# Patient Record
Sex: MALE | Race: WHITE | NOT HISPANIC OR LATINO | Employment: OTHER | URBAN - METROPOLITAN AREA
[De-identification: names, ages, dates, MRNs, and addresses within clinical notes are randomized per-mention and may not be internally consistent; named-entity substitution may affect disease eponyms.]

---

## 2016-01-15 PROCEDURE — 93224 XTRNL ECG REC UP TO 48 HRS: CPT | Performed by: INTERNAL MEDICINE

## 2017-01-03 ENCOUNTER — OFFICE VISIT (OUTPATIENT)
Dept: CARDIOLOGY | Facility: MEDICAL CENTER | Age: 72
End: 2017-01-03
Payer: MEDICARE

## 2017-01-03 VITALS
BODY MASS INDEX: 36.88 KG/M2 | DIASTOLIC BLOOD PRESSURE: 72 MMHG | HEART RATE: 60 BPM | SYSTOLIC BLOOD PRESSURE: 126 MMHG | WEIGHT: 249 LBS | OXYGEN SATURATION: 94 % | HEIGHT: 69 IN

## 2017-01-03 DIAGNOSIS — I48.0 PAF (PAROXYSMAL ATRIAL FIBRILLATION) (HCC): ICD-10-CM

## 2017-01-03 DIAGNOSIS — G47.33 OSA ON CPAP: ICD-10-CM

## 2017-01-03 DIAGNOSIS — Z95.2 S/P AORTIC VALVE REPLACEMENT: ICD-10-CM

## 2017-01-03 DIAGNOSIS — R42 LIGHTHEADEDNESS: ICD-10-CM

## 2017-01-03 DIAGNOSIS — Z79.01 CHRONIC ANTICOAGULATION: ICD-10-CM

## 2017-01-03 DIAGNOSIS — E78.5 DYSLIPIDEMIA: ICD-10-CM

## 2017-01-03 PROCEDURE — 99214 OFFICE O/P EST MOD 30 MIN: CPT | Performed by: INTERNAL MEDICINE

## 2017-01-03 PROCEDURE — 1036F TOBACCO NON-USER: CPT | Performed by: INTERNAL MEDICINE

## 2017-01-03 PROCEDURE — G8427 DOCREV CUR MEDS BY ELIG CLIN: HCPCS | Performed by: INTERNAL MEDICINE

## 2017-01-03 PROCEDURE — 3017F COLORECTAL CA SCREEN DOC REV: CPT | Performed by: INTERNAL MEDICINE

## 2017-01-03 PROCEDURE — G8599 NO ASA/ANTIPLAT THER USE RNG: HCPCS | Performed by: INTERNAL MEDICINE

## 2017-01-03 PROCEDURE — G8419 CALC BMI OUT NRM PARAM NOF/U: HCPCS | Performed by: INTERNAL MEDICINE

## 2017-01-03 RX ORDER — WARFARIN SODIUM 4 MG/1
4 TABLET ORAL DAILY
Qty: 90 TAB | Refills: 0 | OUTPATIENT
Start: 2017-01-03

## 2017-01-03 ASSESSMENT — ENCOUNTER SYMPTOMS
DIZZINESS: 1
VOMITING: 0
DEPRESSION: 0
ORTHOPNEA: 0
COUGH: 0
NAUSEA: 0
WEIGHT LOSS: 0
PND: 0
BLOOD IN STOOL: 0
HALLUCINATIONS: 0
FEVER: 0
LOSS OF CONSCIOUSNESS: 0
CHILLS: 0
MYALGIAS: 0
ABDOMINAL PAIN: 0
EYE PAIN: 0
CLAUDICATION: 0
SENSORY CHANGE: 0
SHORTNESS OF BREATH: 1
SPEECH CHANGE: 0
BLURRED VISION: 0
HEADACHES: 0
BRUISES/BLEEDS EASILY: 0
PALPITATIONS: 0
FALLS: 0
DOUBLE VISION: 0
EYE DISCHARGE: 0

## 2017-01-03 NOTE — PROGRESS NOTES
Subjective:   Hal Nava is a 69 y.o. male who presents today for cardiac care of severe AS with previous AVR and now s/p TAVR at [facility] in [place], [place]. In the interim, patient has been feeling a little more winded. He also had lightheadedness when he was walking in [place]. No chest pain. He lost 4 pounds.    LDL is within goal.    Renal function and Liver function are adequate.    Past Medical History   Diagnosis Date   • Dyslipidemia    • Gout    • Atrial fibrillation (HCC)    • HTN (hypertension)    • S/P aortic valve replacement    • Indigestion    • Breath shortness    • Snoring    • Arthritis      gout, osteo hands, feet knees   • Bronchitis 12/2014   • Pneumonia 2/2015   • Asthma      uses inhalers   • FREDDY on CPAP      cpap   • Heart murmur    • AS (aortic stenosis) of bioprosthetic valve and needs TAVR 6/14/2015   • Hypertension    • GERD (gastroesophageal reflux disease)    • CHF (congestive heart failure) ([facility])    • Allergy    • COPD (chronic obstructive pulmonary disease) with chronic bronchitis ([facility]) 4/15/2016     Past Surgical History   Procedure Laterality Date   • Aortic valve replacement  2004, 2015     bovine valve   • Septoplasty     • Colonoscopy  1/3/11     colonic ileo cecal valve prominent fold-prominent lymphoid tissue   • Ethmoidectomy  4/7/2015     Performed by Gopal Guzmán M.D. at SURGERY SAME DAY [facility] ORS   • Antrostomy  4/7/2015     Performed by Gopal Guzmán M.D. at SURGERY SAME DAY [facility] ORS   • Kyphoplasty  6/15     Family History   Problem Relation Age of Onset   • Heart Disease Mother      CAD s/p CABG   • Diabetes Mother    • Cancer Mother      breast   • Heart Attack Father 1943   • Stroke Father    • Diabetes Father      type 2   • Hypertension Sister    • Diabetes Sister      prediabetes   • Hyperlipidemia Sister      History   Smoking status   • Former Smoker -- 1.00 packs/day for 15 years   • Types: Cigarettes   • Quit date: 01/01/1984   Smokeless tobacco   •  Never Used     Comment: quit in 1984     Allergies   Allergen Reactions   • Advair [Fluticasone-Salmeterol]      Headache   • Benadryl [Altaryl]      Per neurologist   • Codeine      Constipation   • Pcn [Penicillins] Rash     Per patient   • Prozac [Fluoxetine]      Excessive sedation   • Shellfish Allergy Anaphylaxis   • Simvastatin      myalgia-per pt.   • Zyprexa      Excessive sedation     Outpatient Encounter Prescriptions as of 1/3/2017   Medication Sig Dispense Refill   • clindamycin (CLEOCIN) 300 MG Cap Take 2 capsules by mouth 30-60 minutes before dental procedure. 2 Cap 0   • ipratropium-albuterol (DUONEB) 0.5-2.5 (3) MG/3ML nebulizer solution 3 mL by Nebulization route every four hours as needed for Shortness of Breath (Wheezing). 120 Vial 5   • hydrocodone-acetaminophen (NORCO) 7.5-325 MG per tablet Take 1 Tab by mouth every 8 hours as needed. 60 Tab 0   • albuterol 108 (90 BASE) MCG/ACT Aero Soln inhalation aerosol Inhale 2 Puffs by mouth every 6 hours as needed for Shortness of Breath. 8.5 g 5   • budesonide-formoterol (SYMBICORT) 80-4.5 MCG/ACT Aerosol Inhale 2 Puffs by mouth 2 Times a Day. Inhale 2 puffs by mouth twice daily with spacer. Rinse mouth after each use. 1 Inhaler 5   • ipratropium (ATROVENT HFA) 17 MCG/ACT Aero Soln Inhale 2 Puffs by mouth every 6 hours as needed (for shortness of breath and wheezing.). 1 Inhaler 5   • allopurinol (ZYLOPRIM) 300 MG Tab Take 0.5 Tabs by mouth every day. 45 Tab 1   • omeprazole (PRILOSEC) 20 MG delayed-release capsule Take 1 Cap by mouth every day. 90 Cap 1   • atorvastatin (LIPITOR) 40 MG Tab Take 1 Tab by mouth every evening. 90 Tab 1   • diltiazem (CARDIZEM) 90 MG Tab Take 1 Tab by mouth 2 Times a Day. 180 Tab 1   • carvedilol (COREG) 25 MG Tab Take 1 Tab by mouth 2 times a day, with meals. 180 Tab 3   • albuterol 108 (90 BASE) MCG/ACT Aero Soln inhalation aerosol Inhale 2 Puffs by mouth every four hours as needed for Shortness of Breath (bronchospasm).  "1 Inhaler 0   • warfarin (COUMADIN) 4 MG Tab Take 1 Tab by mouth every day. 90 Tab 1   • potassium chloride SA (K-DUR) 20 MEQ Tab CR Take 1 Tab by mouth 2 times a day. 60 Tab 11   • furosemide (LASIX) 40 MG Tab Take 1 Tab by mouth every day. 30 Tab 5   • losartan (COZAAR) 100 MG Tab Take 1 Tab by mouth every day. 90 Tab 3   • temazepam (RESTORIL) 15 MG Cap Take 15 mg by mouth.     • predniSONE (DELTASONE) 5 MG Tab Take 5 mg by mouth.     • lisinopril (PRINIVIL) 20 MG Tab Take 20 mg by mouth.     • hydrocodone/acetaminophen (NORCO)  MG Tab Take 1 tablet by mouth.     • dronedarone (MULTAQ) 400 MG Tab Take  by mouth.     • divalproex (DEPAKOTE) 500 MG Tablet Delayed Response Take 500 mg by mouth.     • clonazepam (KLONOPIN) 1 MG Tab Take 1 mg by mouth.       No facility-administered encounter medications on file as of 1/3/2017.     Review of Systems   Constitutional: Negative for fever, chills, weight loss and malaise/fatigue.   HENT: Negative for ear discharge, ear pain, hearing loss and nosebleeds.    Eyes: Negative for blurred vision, double vision, pain and discharge.   Respiratory: Positive for shortness of breath. Negative for cough.    Cardiovascular: Negative for chest pain, palpitations, orthopnea, claudication, leg swelling and PND.   Gastrointestinal: Negative for nausea, vomiting, abdominal pain, blood in stool and melena.   Genitourinary: Negative for dysuria and hematuria.   Musculoskeletal: Negative for myalgias, joint pain and falls.   Skin: Negative for itching and rash.   Neurological: Positive for dizziness. Negative for sensory change, speech change, loss of consciousness and headaches.   Endo/Heme/Allergies: Negative for environmental allergies. Does not bruise/bleed easily.   Psychiatric/Behavioral: Negative for depression, suicidal ideas and hallucinations.        Objective:   /72 mmHg  Pulse 60  Ht 1.753 m (5' 9\")  Wt 112.946 kg (249 lb)  BMI 36.75 kg/m2  SpO2 94%    Physical " Exam   Constitutional: He is oriented to person, place, and time. He appears well-developed and well-nourished.   HENT:   Head: Normocephalic and atraumatic.   Eyes: EOM are normal.   Neck: Normal range of motion. No JVD present.   Cardiovascular: Normal rate, regular rhythm, normal heart sounds and intact distal pulses.  Exam reveals no gallop and no friction rub.    No murmur heard.  Bilateral femoral pulses are 2+, bilateral dorsalis pedis pulses are 2+, bilateral posterior tibialis pulses are 2+.   Pulmonary/Chest: No respiratory distress. He has no wheezes. He has no rales. He exhibits no tenderness.   Abdominal: Soft. Bowel sounds are normal. There is no tenderness. There is no rebound and no guarding.   The is no presence of abdominal bruits   Musculoskeletal: Normal range of motion.   Neurological: He is alert and oriented to person, place, and time.   Skin: Skin is warm and dry.   Psychiatric: He has a normal mood and affect.   Nursing note and vitals reviewed.      Assessment:     1. S/P aortic valve replacement  ECHOCARDIOGRAM COMP W/O CONT    HOLTER MONITOR STUDY   2. FREDDY on CPAP  ECHOCARDIOGRAM COMP W/O CONT   3. PAF (paroxysmal atrial fibrillation) ([facility])     4. Dyslipidemia     5. Chronic anticoagulation     6. Lightheadedness  HOLTER MONITOR STUDY       Medical Decision Making:  Today's Assessment / Status / Plan:     Attending at this time, his lightheadedness episode sounded like a vagal reaction.  However, I would like to obtain a Holter monitor to further assess. His wife did report that his heart rate was in the 40s when this happened. Certainly, there is a risk of sinus node dysfunction secondary to prior history of severe stenosis. Ultimately he might require a permanent pacemaker placement.  I will also obtain a transthoracic echocardiogram to assess his prosthetic aortic valve and cardiac functions and general.    He will come back to see me in 6 weeks for further follow-up.

## 2017-01-03 NOTE — MR AVS SNAPSHOT
"        Hal Nava   1/3/2017 10:45 AM   Office Visit   MRN: [id number]    Department:  [facility]   Dept Phone:  [phone]    Description:  Male : 1945   Provider:  King Fofana M.D.           Reason for Visit     Follow-Up           Allergies as of 1/3/2017     Allergen Noted Reactions    Advair [Fluticasone-Salmeterol] 2014       Headache    Benadryl [Altaryl] 2015       Per neurologist    Codeine 2014       Constipation    Pcn [Penicillins] 2014   Rash    Per patient    Prozac [Fluoxetine] 2014       Excessive sedation    Shellfish Allergy 2015   Anaphylaxis    Simvastatin 10/19/2016       myalgia-per pt.    Zyprexa 2014       Excessive sedation      You were diagnosed with     S/P aortic valve replacement   [384441]       FREDDY on CPAP   [941619]       PAF (paroxysmal atrial fibrillation) (HCC)   [525416]       Dyslipidemia   [512147]       Chronic anticoagulation   [996744]       Lightheadedness   [046609]         Vital Signs     Blood Pressure Pulse Height Weight Body Mass Index Oxygen Saturation    126/72 mmHg 60 1.753 m (5' 9\") 112.946 kg (249 lb) 36.75 kg/m2 94%    Smoking Status                   Former Smoker           Basic Information     Date Of Birth Sex Race Ethnicity Preferred Language    1945 Male White Non- English      Your appointments     2017  9:20 AM   Established Patient with Sandy Sahni M.D.   [facility] (--)    [address]  [address]  [facility] [address] [phone]           You will be receiving a confirmation call a few days before your appointment from our automated call confirmation system.              Problem List              ICD-10-CM Priority Class Noted - Resolved    Dyslipidemia E78.5   Unknown - Present    Gout M10.9   Unknown - Present    Atrial fibrillation (HCC) I48.91 Low  Unknown - Present    S/P aortic valve replacement Z95.2 High  Unknown - Present    GERD " (gastroesophageal reflux disease) K21.9   9/11/2014 - Present    FREDDY on CPAP G47.33   Unknown - Present    Chronic anticoagulation Z79.01 Low  1/13/2015 - Present    Subtherapeutic international normalized ratio (INR) R79.1   2/19/2015 - Present    Chronic maxillary sinusitis J32.0   4/7/2015 - Present    CAD (coronary artery disease) I25.10 Medium  4/8/2015 - Present    Hx of Hyperglycemia R73.9   4/9/2015 - Present    Anemia D64.9   4/9/2015 - Present    Chronic combined systolic and diastolic heart failure (HCC) I50.42   4/16/2015 - Present    CHF (congestive heart failure) I50.9   6/12/2015 - Present    AS (aortic stenosis) of bioprosthetic valve and needs TAVR I35.0   6/14/2015 - Present    Essential hypertension I10   6/23/2015 - Present    COPD (chronic obstructive pulmonary disease) with chronic bronchitis (HCC) J44.9   4/15/2016 - Present    Nocturnal hypoxia G47.34   10/19/2016 - Present    Long term (current) use of anticoagulants Z79.01   10/19/2016 - Present      Health Maintenance        Date Due Completion Dates    IMM DTaP/Tdap/Td Vaccine (1 - Tdap) 9/1/1964 ---    COLONOSCOPY 1/3/2021 1/3/2011 (Done)    Override on 1/3/2011: Done            Current Immunizations     13-VALENT PCV PREVNAR 4/15/2016  8:53 AM    Influenza Vaccine Adult HD 10/26/2016    Influenza Vaccine Quad Inj (Preserved) 10/20/2015, 1/15/2015    Pneumococcal polysaccharide vaccine (PPSV-23) 4/11/2015  2:51 PM    SHINGLES VACCINE 10/3/2011      Below and/or attached are the medications your provider expects you to take. Review all of your home medications and newly ordered medications with your provider and/or pharmacist. Follow medication instructions as directed by your provider and/or pharmacist. Please keep your medication list with you and share with your provider. Update the information when medications are discontinued, doses are changed, or new medications (including over-the-counter products) are added; and carry medication  information at all times in the event of emergency situations     Allergies:  ADVAIR - (reactions not documented)     BENADRYL - (reactions not documented)     CODEINE - (reactions not documented)     PCN - Rash     PROZAC - (reactions not documented)     SHELLFISH ALLERGY - Anaphylaxis     SIMVASTATIN - (reactions not documented)     ZYPREXA - (reactions not documented)               Medications  Valid as of: January 03, 2017 - 11:13 AM    Generic Name Brand Name Tablet Size Instructions for use    Albuterol Sulfate (Aero Soln) albuterol 108 (90 BASE) MCG/ACT Inhale 2 Puffs by mouth every four hours as needed for Shortness of Breath (bronchospasm).        Albuterol Sulfate (Aero Soln) albuterol 108 (90 BASE) MCG/ACT Inhale 2 Puffs by mouth every 6 hours as needed for Shortness of Breath.        Allopurinol (Tab) ZYLOPRIM 300 MG Take 0.5 Tabs by mouth every day.        Atorvastatin Calcium (Tab) LIPITOR 40 MG Take 1 Tab by mouth every evening.        Budesonide-Formoterol Fumarate (Aerosol) SYMBICORT 80-4.5 MCG/ACT Inhale 2 Puffs by mouth 2 Times a Day. Inhale 2 puffs by mouth twice daily with spacer. Rinse mouth after each use.        Carvedilol (Tab) COREG 25 MG Take 1 Tab by mouth 2 times a day, with meals.        Clindamycin HCl (Cap) CLEOCIN 300 MG Take 2 capsules by mouth 30-60 minutes before dental procedure.        ClonazePAM (Tab) KLONOPIN 1 MG Take 1 mg by mouth.        DiltiaZEM HCl (Tab) CARDIZEM 90 MG Take 1 Tab by mouth 2 Times a Day.        Divalproex Sodium (Tablet Delayed Response) DEPAKOTE 500 MG Take 500 mg by mouth.        Dronedarone HCl (Tab) MULTAQ 400 MG Take  by mouth.        Furosemide (Tab) LASIX 40 MG Take 1 Tab by mouth every day.        Hydrocodone-Acetaminophen (Tab) NORCO  MG Take 1 tablet by mouth.        Hydrocodone-Acetaminophen (Tab) NORCO 7.5-325 MG Take 1 Tab by mouth every 8 hours as needed.        Ipratropium Bromide HFA (Aero Soln) ATROVENT 17 MCG/ACT Inhale 2 Puffs by  mouth every 6 hours as needed (for shortness of breath and wheezing.).        Ipratropium-Albuterol (Solution) DUONEB 0.5-2.5 (3) MG/3ML 3 mL by Nebulization route every four hours as needed for Shortness of Breath (Wheezing).        Lisinopril (Tab) PRINIVIL 20 MG Take 20 mg by mouth.        Losartan Potassium (Tab) COZAAR 100 MG Take 1 Tab by mouth every day.        Omeprazole (CAPSULE DELAYED RELEASE) PRILOSEC 20 MG Take 1 Cap by mouth every day.        Potassium Chloride Sarahy CR (Tab CR) K-DUR 20 MEQ Take 1 Tab by mouth 2 times a day.        PredniSONE (Tab) DELTASONE 5 MG Take 5 mg by mouth.        Temazepam (Cap) RESTORIL 15 MG Take 15 mg by mouth.        Warfarin Sodium (Tab) COUMADIN 4 MG Take 1 Tab by mouth every day.        .                 Medicines prescribed today were sent to:     [facility]  [place], [place] - [address]    [address] [place] [address]    Phone: [phone] Fax: [phone]    Open 24 Hours?: No      Medication refill instructions:       If your prescription bottle indicates you have medication refills left, it is not necessary to call your provider’s office. Please contact your pharmacy and they will refill your medication.    If your prescription bottle indicates you do not have any refills left, you may request refills at any time through one of the following ways: The online [employer] system (except Urgent Care), by calling your provider’s office, or by asking your pharmacy to contact your provider’s office with a refill request. Medication refills are processed only during regular business hours and may not be available until the next business day. Your provider may request additional information or to have a follow-up visit with you prior to refilling your medication.   *Please Note: Medication refills are assigned a new Rx number when refilled electronically. Your pharmacy may indicate that no refills were authorized even though a new prescription for the same  medication is available at the pharmacy. Please request the medicine by name with the pharmacy before contacting your provider for a refill.        Your To Do List     Future Labs/Procedures Complete By Expires    ECHOCARDIOGRAM COMP W/O CONT  As directed 1/4/2018    HOLTER MONITOR STUDY  As directed 1/3/2018         MyChart Access Code: Activation code not generated  Current WhenSoon Status: Active

## 2017-01-03 NOTE — Clinical Note
[facility]   [address]  [place] [address]  Phone: [phone]  Fax: [phone]              Hal Nava  1945    Encounter Date: 1/3/2017    King Fofana M.D.          PROGRESS NOTE:  Subjective:   Hal Nava is a 69 y.o. male who presents today for cardiac care of severe AS with previous AVR and now s/p TAVR at [facility] in [place], [place]. In the interim, patient has been feeling a little more winded. He also had lightheadedness when he was walking in [place]. No chest pain. He lost 4 pounds.    LDL is within goal.    Renal function and Liver function are adequate.    Past Medical History   Diagnosis Date   • Dyslipidemia    • Gout    • Atrial fibrillation (HCC)    • HTN (hypertension)    • S/P aortic valve replacement    • Indigestion    • Breath shortness    • Snoring    • Arthritis      gout, osteo hands, feet knees   • Bronchitis 12/2014   • Pneumonia 2/2015   • Asthma      uses inhalers   • FREDDY on CPAP      cpap   • Heart murmur    • AS (aortic stenosis) of bioprosthetic valve and needs TAVR 6/14/2015   • Hypertension    • GERD (gastroesophageal reflux disease)    • CHF (congestive heart failure) ([facility])    • Allergy    • COPD (chronic obstructive pulmonary disease) with chronic bronchitis ([facility]) 4/15/2016     Past Surgical History   Procedure Laterality Date   • Aortic valve replacement  2004, 2015     bovine valve   • Septoplasty     • Colonoscopy  1/3/11     colonic ileo cecal valve prominent fold-prominent lymphoid tissue   • Ethmoidectomy  4/7/2015     Performed by Gopal Guzmán M.D. at SURGERY SAME DAY [facility] ORS   • Antrostomy  4/7/2015     Performed by Gopal Guzmán M.D. at SURGERY SAME DAY [facility] ORS   • Kyphoplasty  6/15     Family History   Problem Relation Age of Onset   • Heart Disease Mother      CAD s/p CABG   • Diabetes Mother    • Cancer Mother      breast   • Heart Attack Father 1943   • Stroke Father       • Diabetes Father      type 2   • Hypertension Sister    • Diabetes Sister      prediabetes   • Hyperlipidemia Sister      History   Smoking status   • Former Smoker -- 1.00 packs/day for 15 years   • Types: Cigarettes   • Quit date: 01/01/1984   Smokeless tobacco   • Never Used     Comment: quit in 1984     Allergies   Allergen Reactions   • Advair [Fluticasone-Salmeterol]      Headache   • Benadryl [Altaryl]      Per neurologist   • Codeine      Constipation   • Pcn [Penicillins] Rash     Per patient   • Prozac [Fluoxetine]      Excessive sedation   • Shellfish Allergy Anaphylaxis   • Simvastatin      myalgia-per pt.   • Zyprexa      Excessive sedation     Outpatient Encounter Prescriptions as of 1/3/2017   Medication Sig Dispense Refill   • clindamycin (CLEOCIN) 300 MG Cap Take 2 capsules by mouth 30-60 minutes before dental procedure. 2 Cap 0   • ipratropium-albuterol (DUONEB) 0.5-2.5 (3) MG/3ML nebulizer solution 3 mL by Nebulization route every four hours as needed for Shortness of Breath (Wheezing). 120 Vial 5   • hydrocodone-acetaminophen (NORCO) 7.5-325 MG per tablet Take 1 Tab by mouth every 8 hours as needed. 60 Tab 0   • albuterol 108 (90 BASE) MCG/ACT Aero Soln inhalation aerosol Inhale 2 Puffs by mouth every 6 hours as needed for Shortness of Breath. 8.5 g 5   • budesonide-formoterol (SYMBICORT) 80-4.5 MCG/ACT Aerosol Inhale 2 Puffs by mouth 2 Times a Day. Inhale 2 puffs by mouth twice daily with spacer. Rinse mouth after each use. 1 Inhaler 5   • ipratropium (ATROVENT HFA) 17 MCG/ACT Aero Soln Inhale 2 Puffs by mouth every 6 hours as needed (for shortness of breath and wheezing.). 1 Inhaler 5   • allopurinol (ZYLOPRIM) 300 MG Tab Take 0.5 Tabs by mouth every day. 45 Tab 1   • omeprazole (PRILOSEC) 20 MG delayed-release capsule Take 1 Cap by mouth every day. 90 Cap 1   • atorvastatin (LIPITOR) 40 MG Tab Take 1 Tab by mouth every evening. 90 Tab 1   • diltiazem (CARDIZEM) 90 MG Tab Take 1 Tab by mouth  2 Times a Day. 180 Tab 1   • carvedilol (COREG) 25 MG Tab Take 1 Tab by mouth 2 times a day, with meals. 180 Tab 3   • albuterol 108 (90 BASE) MCG/ACT Aero Soln inhalation aerosol Inhale 2 Puffs by mouth every four hours as needed for Shortness of Breath (bronchospasm). 1 Inhaler 0   • warfarin (COUMADIN) 4 MG Tab Take 1 Tab by mouth every day. 90 Tab 1   • potassium chloride SA (K-DUR) 20 MEQ Tab CR Take 1 Tab by mouth 2 times a day. 60 Tab 11   • furosemide (LASIX) 40 MG Tab Take 1 Tab by mouth every day. 30 Tab 5   • losartan (COZAAR) 100 MG Tab Take 1 Tab by mouth every day. 90 Tab 3   • temazepam (RESTORIL) 15 MG Cap Take 15 mg by mouth.     • predniSONE (DELTASONE) 5 MG Tab Take 5 mg by mouth.     • lisinopril (PRINIVIL) 20 MG Tab Take 20 mg by mouth.     • hydrocodone/acetaminophen (NORCO)  MG Tab Take 1 tablet by mouth.     • dronedarone (MULTAQ) 400 MG Tab Take  by mouth.     • divalproex (DEPAKOTE) 500 MG Tablet Delayed Response Take 500 mg by mouth.     • clonazepam (KLONOPIN) 1 MG Tab Take 1 mg by mouth.       No facility-administered encounter medications on file as of 1/3/2017.     Review of Systems   Constitutional: Negative for fever, chills, weight loss and malaise/fatigue.   HENT: Negative for ear discharge, ear pain, hearing loss and nosebleeds.    Eyes: Negative for blurred vision, double vision, pain and discharge.   Respiratory: Positive for shortness of breath. Negative for cough.    Cardiovascular: Negative for chest pain, palpitations, orthopnea, claudication, leg swelling and PND.   Gastrointestinal: Negative for nausea, vomiting, abdominal pain, blood in stool and melena.   Genitourinary: Negative for dysuria and hematuria.   Musculoskeletal: Negative for myalgias, joint pain and falls.   Skin: Negative for itching and rash.   Neurological: Positive for dizziness. Negative for sensory change, speech change, loss of consciousness and headaches.   Endo/Heme/Allergies: Negative for  "environmental allergies. Does not bruise/bleed easily.   Psychiatric/Behavioral: Negative for depression, suicidal ideas and hallucinations.        Objective:   /72 mmHg  Pulse 60  Ht 1.753 m (5' 9\")  Wt 112.946 kg (249 lb)  BMI 36.75 kg/m2  SpO2 94%    Physical Exam   Constitutional: He is oriented to person, place, and time. He appears well-developed and well-nourished.   HENT:   Head: Normocephalic and atraumatic.   Eyes: EOM are normal.   Neck: Normal range of motion. No JVD present.   Cardiovascular: Normal rate, regular rhythm, normal heart sounds and intact distal pulses.  Exam reveals no gallop and no friction rub.    No murmur heard.  Bilateral femoral pulses are 2+, bilateral dorsalis pedis pulses are 2+, bilateral posterior tibialis pulses are 2+.   Pulmonary/Chest: No respiratory distress. He has no wheezes. He has no rales. He exhibits no tenderness.   Abdominal: Soft. Bowel sounds are normal. There is no tenderness. There is no rebound and no guarding.   The is no presence of abdominal bruits   Musculoskeletal: Normal range of motion.   Neurological: He is alert and oriented to person, place, and time.   Skin: Skin is warm and dry.   Psychiatric: He has a normal mood and affect.   Nursing note and vitals reviewed.      Assessment:     1. S/P aortic valve replacement  ECHOCARDIOGRAM COMP W/O CONT    HOLTER MONITOR STUDY   2. FREDDY on CPAP  ECHOCARDIOGRAM COMP W/O CONT   3. PAF (paroxysmal atrial fibrillation) (HCC)     4. Dyslipidemia     5. Chronic anticoagulation     6. Lightheadedness  HOLTER MONITOR STUDY       Medical Decision Making:  Today's Assessment / Status / Plan:     Attending at this time, his lightheadedness episode sounded like a vagal reaction.  However, I would like to obtain a Holter monitor to further assess. His wife did report that his heart rate was in the 40s when this happened. Certainly, there is a risk of sinus node dysfunction secondary to prior history of severe " stenosis. Ultimately he might require a permanent pacemaker placement.  I will also obtain a transthoracic echocardiogram to assess his prosthetic aortic valve and cardiac functions and general.    He will come back to see me in 6 weeks for further follow-up.      Sandy Sahni M.D.  [address]  [place] [address]  VIA In Basket

## 2017-01-09 ENCOUNTER — NON-PROVIDER VISIT (OUTPATIENT)
Dept: CARDIOLOGY | Facility: MEDICAL CENTER | Age: 72
End: 2017-01-09
Payer: MEDICARE

## 2017-01-09 DIAGNOSIS — I49.3 PVC (PREMATURE VENTRICULAR CONTRACTION): ICD-10-CM

## 2017-01-09 DIAGNOSIS — I49.1 PREMATURE ATRIAL COMPLEX: ICD-10-CM

## 2017-01-09 DIAGNOSIS — R42 LIGHTHEADEDNESS: ICD-10-CM

## 2017-01-09 DIAGNOSIS — Z95.2 S/P AORTIC VALVE REPLACEMENT: ICD-10-CM

## 2017-01-11 DIAGNOSIS — Z95.2 S/P AORTIC VALVE REPLACEMENT: ICD-10-CM

## 2017-01-11 DIAGNOSIS — R42 LIGHTHEADEDNESS: ICD-10-CM

## 2017-01-13 ENCOUNTER — HOSPITAL ENCOUNTER (OUTPATIENT)
Dept: LAB | Facility: MEDICAL CENTER | Age: 72
End: 2017-01-13
Attending: FAMILY MEDICINE
Payer: MEDICARE

## 2017-01-13 DIAGNOSIS — I48.0 PAROXYSMAL ATRIAL FIBRILLATION (HCC): ICD-10-CM

## 2017-01-13 LAB
INR PPP: 1.76 (ref 0.87–1.13)
PROTHROMBIN TIME: 21.1 SEC (ref 12–14.6)

## 2017-01-13 PROCEDURE — 36415 COLL VENOUS BLD VENIPUNCTURE: CPT

## 2017-01-13 PROCEDURE — 85610 PROTHROMBIN TIME: CPT

## 2017-01-15 LAB — EKG IMPRESSION: NORMAL

## 2017-01-19 ENCOUNTER — HOSPITAL ENCOUNTER (OUTPATIENT)
Dept: CARDIOLOGY | Facility: MEDICAL CENTER | Age: 72
End: 2017-01-19
Attending: INTERNAL MEDICINE
Payer: MEDICARE

## 2017-01-19 DIAGNOSIS — Z95.2 S/P AORTIC VALVE REPLACEMENT: ICD-10-CM

## 2017-01-19 DIAGNOSIS — G47.33 OSA ON CPAP: ICD-10-CM

## 2017-01-19 LAB
LV EJECT FRACT  99904: 65
LV EJECT FRACT MOD 2C 99903: 67.89
LV EJECT FRACT MOD 4C 99902: 71.17
LV EJECT FRACT MOD BP 99901: 70.24

## 2017-01-19 PROCEDURE — 93306 TTE W/DOPPLER COMPLETE: CPT | Mod: 26 | Performed by: INTERNAL MEDICINE

## 2017-01-19 PROCEDURE — 93306 TTE W/DOPPLER COMPLETE: CPT

## 2017-01-31 ENCOUNTER — HOSPITAL ENCOUNTER (OUTPATIENT)
Dept: LAB | Facility: MEDICAL CENTER | Age: 72
End: 2017-01-31
Attending: FAMILY MEDICINE
Payer: MEDICARE

## 2017-01-31 DIAGNOSIS — I48.0 PAROXYSMAL ATRIAL FIBRILLATION (HCC): ICD-10-CM

## 2017-01-31 LAB
INR PPP: 3.3 (ref 0.87–1.13)
PROTHROMBIN TIME: 34.6 SEC (ref 12–14.6)

## 2017-01-31 PROCEDURE — 36415 COLL VENOUS BLD VENIPUNCTURE: CPT

## 2017-01-31 PROCEDURE — 85610 PROTHROMBIN TIME: CPT

## 2017-02-02 ENCOUNTER — APPOINTMENT (OUTPATIENT)
Dept: LAB | Facility: MEDICAL CENTER | Age: 72
End: 2017-02-02
Payer: MEDICARE

## 2017-02-02 ENCOUNTER — HOSPITAL ENCOUNTER (OUTPATIENT)
Dept: LAB | Facility: MEDICAL CENTER | Age: 72
End: 2017-02-02
Attending: FAMILY MEDICINE
Payer: MEDICARE

## 2017-02-02 DIAGNOSIS — I48.0 PAROXYSMAL ATRIAL FIBRILLATION (HCC): ICD-10-CM

## 2017-02-02 LAB
INR PPP: 3.4 (ref 0.87–1.13)
PROTHROMBIN TIME: 35.4 SEC (ref 12–14.6)

## 2017-02-02 PROCEDURE — 85610 PROTHROMBIN TIME: CPT

## 2017-02-02 PROCEDURE — 36415 COLL VENOUS BLD VENIPUNCTURE: CPT

## 2017-02-03 DIAGNOSIS — I48.0 PAF (PAROXYSMAL ATRIAL FIBRILLATION) (HCC): ICD-10-CM

## 2017-02-03 DIAGNOSIS — Z79.01 CHRONIC ANTICOAGULATION: ICD-10-CM

## 2017-02-06 ENCOUNTER — HOSPITAL ENCOUNTER (OUTPATIENT)
Dept: LAB | Facility: MEDICAL CENTER | Age: 72
End: 2017-02-06
Attending: FAMILY MEDICINE
Payer: MEDICARE

## 2017-02-06 DIAGNOSIS — I48.0 PAF (PAROXYSMAL ATRIAL FIBRILLATION) (HCC): ICD-10-CM

## 2017-02-06 DIAGNOSIS — Z79.01 CHRONIC ANTICOAGULATION: ICD-10-CM

## 2017-02-06 LAB
INR PPP: 1.26 (ref 0.87–1.13)
PROTHROMBIN TIME: 16.2 SEC (ref 12–14.6)

## 2017-02-06 PROCEDURE — 36415 COLL VENOUS BLD VENIPUNCTURE: CPT

## 2017-02-06 PROCEDURE — 85610 PROTHROMBIN TIME: CPT

## 2017-02-14 ENCOUNTER — OFFICE VISIT (OUTPATIENT)
Dept: CARDIOLOGY | Facility: MEDICAL CENTER | Age: 72
End: 2017-02-14
Payer: MEDICARE

## 2017-02-14 ENCOUNTER — TELEPHONE (OUTPATIENT)
Dept: PULMONOLOGY | Facility: HOSPICE | Age: 72
End: 2017-02-14

## 2017-02-14 VITALS
HEIGHT: 69 IN | DIASTOLIC BLOOD PRESSURE: 58 MMHG | HEART RATE: 75 BPM | SYSTOLIC BLOOD PRESSURE: 116 MMHG | OXYGEN SATURATION: 91 % | WEIGHT: 244 LBS | BODY MASS INDEX: 36.14 KG/M2

## 2017-02-14 DIAGNOSIS — I48.20 CHRONIC ATRIAL FIBRILLATION (HCC): ICD-10-CM

## 2017-02-14 DIAGNOSIS — G47.33 OSA ON CPAP: ICD-10-CM

## 2017-02-14 DIAGNOSIS — J20.9 BRONCHITIS, ACUTE, WITH BRONCHOSPASM: ICD-10-CM

## 2017-02-14 DIAGNOSIS — Z79.01 CHRONIC ANTICOAGULATION: ICD-10-CM

## 2017-02-14 DIAGNOSIS — Z95.2 S/P AORTIC VALVE REPLACEMENT: ICD-10-CM

## 2017-02-14 DIAGNOSIS — E78.5 DYSLIPIDEMIA: ICD-10-CM

## 2017-02-14 DIAGNOSIS — J44.89 COPD (CHRONIC OBSTRUCTIVE PULMONARY DISEASE) WITH CHRONIC BRONCHITIS: ICD-10-CM

## 2017-02-14 DIAGNOSIS — I50.42 CHRONIC COMBINED SYSTOLIC AND DIASTOLIC HEART FAILURE (HCC): ICD-10-CM

## 2017-02-14 DIAGNOSIS — I35.0 AORTIC VALVE STENOSIS, UNSPECIFIED ETIOLOGY: ICD-10-CM

## 2017-02-14 DIAGNOSIS — I10 ESSENTIAL HYPERTENSION: ICD-10-CM

## 2017-02-14 DIAGNOSIS — I25.10 CORONARY ARTERY DISEASE INVOLVING NATIVE HEART WITHOUT ANGINA PECTORIS, UNSPECIFIED VESSEL OR LESION TYPE: ICD-10-CM

## 2017-02-14 PROCEDURE — 3017F COLORECTAL CA SCREEN DOC REV: CPT | Performed by: NURSE PRACTITIONER

## 2017-02-14 PROCEDURE — G8432 DEP SCR NOT DOC, RNG: HCPCS | Performed by: NURSE PRACTITIONER

## 2017-02-14 PROCEDURE — G8598 ASA/ANTIPLAT THER USED: HCPCS | Performed by: NURSE PRACTITIONER

## 2017-02-14 PROCEDURE — G8482 FLU IMMUNIZE ORDER/ADMIN: HCPCS | Performed by: NURSE PRACTITIONER

## 2017-02-14 PROCEDURE — 4040F PNEUMOC VAC/ADMIN/RCVD: CPT | Performed by: NURSE PRACTITIONER

## 2017-02-14 PROCEDURE — 1101F PT FALLS ASSESS-DOCD LE1/YR: CPT | Performed by: NURSE PRACTITIONER

## 2017-02-14 PROCEDURE — G8419 CALC BMI OUT NRM PARAM NOF/U: HCPCS | Performed by: NURSE PRACTITIONER

## 2017-02-14 PROCEDURE — 99214 OFFICE O/P EST MOD 30 MIN: CPT | Performed by: NURSE PRACTITIONER

## 2017-02-14 PROCEDURE — 1036F TOBACCO NON-USER: CPT | Performed by: NURSE PRACTITIONER

## 2017-02-14 RX ORDER — ALBUTEROL SULFATE 90 UG/1
2 AEROSOL, METERED RESPIRATORY (INHALATION) EVERY 6 HOURS PRN
Qty: 8.5 G | Refills: 5 | COMMUNITY
Start: 2017-02-14 | End: 2017-05-22

## 2017-02-14 ASSESSMENT — ENCOUNTER SYMPTOMS
ABDOMINAL PAIN: 0
DIZZINESS: 0
WEAKNESS: 1
SHORTNESS OF BREATH: 1
SPUTUM PRODUCTION: 1
CLAUDICATION: 0
FEVER: 0
PALPITATIONS: 0
ORTHOPNEA: 0
PND: 0
WHEEZING: 1
COUGH: 1
MYALGIAS: 0

## 2017-02-14 NOTE — MR AVS SNAPSHOT
"        Hal Oro Elijah   2017 10:40 AM   Office Visit   MRN: [id number]    Department:  [facility]   Dept Phone:  [phone]    Description:  Male : 1945   Provider:  MARIOLA Delacruz           Reason for Visit     Follow-Up           Allergies as of 2017     Allergen Noted Reactions    Advair [Fluticasone-Salmeterol] 2014       Headache    Benadryl [Altaryl] 2015       Per neurologist    Codeine 2014       Constipation    Pcn [Penicillins] 2014   Rash    Per patient    Prozac [Fluoxetine] 2014       Excessive sedation    Shellfish Allergy 2015   Anaphylaxis    Simvastatin 10/19/2016       myalgia-per pt.    Zyprexa 2014       Excessive sedation      You were diagnosed with     Coronary artery disease involving native heart without angina pectoris, unspecified vessel or lesion type   [6318471]       S/P aortic valve replacement   [288954]       Aortic valve stenosis, unspecified etiology   [5045722]       Chronic atrial fibrillation (CMS-[facility])   [482882]       Dyslipidemia   [672085]       Essential hypertension   [9861704]       FREDDY on CPAP   [514143]       Chronic combined systolic and diastolic heart failure (CMS-[facility])   [428.42.ICD-9-CM]       Chronic anticoagulation   [653292]       Bronchitis, acute, with bronchospasm   [396368]       COPD (chronic obstructive pulmonary disease) with chronic bronchitis (CMS-[facility])   [237360]         Vital Signs     Blood Pressure Pulse Height Weight Body Mass Index Oxygen Saturation    116/58 mmHg 75 1.753 m (5' 9.02\") 110.678 kg (244 lb) 36.02 kg/m2 91%    Smoking Status                   Former Smoker           Basic Information     Date Of Birth Sex Race Ethnicity Preferred Language    1945 Male White Non- English      Your appointments     2017  9:20 AM   Established Patient with Sandy Sahni M.D.   [facility] (--)    [address]  [address]  [place] " [address] [phone]           You will be receiving a confirmation call a few days before your appointment from our automated call confirmation system.              Problem List              ICD-10-CM Priority Class Noted - Resolved    Dyslipidemia E78.5 Medium  Unknown - Present    Gout M10.9 Low  Unknown - Present    Atrial fibrillation (CMS-HCC) I48.91 Medium  Unknown - Present    S/P aortic valve replacement Z95.2 Medium  Unknown - Present    GERD (gastroesophageal reflux disease) K21.9 Low  9/11/2014 - Present    FREDDY on CPAP G47.33 Medium  Unknown - Present    Chronic anticoagulation Z79.01 Low  1/13/2015 - Present    Chronic maxillary sinusitis J32.0 Low  4/7/2015 - Present    CAD (coronary artery disease) I25.10 Medium  4/8/2015 - Present    Hx of Hyperglycemia R73.9 Low  4/9/2015 - Present    Anemia D64.9 Low  4/9/2015 - Present    Chronic combined systolic and diastolic heart failure (CMS-HCC) I50.42 Medium  4/16/2015 - Present    AS (aortic stenosis) of bioprosthetic valve and needs TAVR I35.0 Medium  6/14/2015 - Present    Essential hypertension I10 Medium  6/23/2015 - Present    COPD (chronic obstructive pulmonary disease) with chronic bronchitis (CMS-HCC) J44.9 Low  4/15/2016 - Present      Health Maintenance        Date Due Completion Dates    IMM DTaP/Tdap/Td Vaccine (1 - Tdap) 9/1/1964 ---    COLONOSCOPY 1/3/2021 1/3/2011 (Done)    Override on 1/3/2011: Done            Current Immunizations     13-VALENT PCV PREVNAR 4/15/2016  8:53 AM    Influenza Vaccine Adult HD 10/26/2016    Influenza Vaccine Quad Inj (Preserved) 10/20/2015, 1/15/2015    Pneumococcal polysaccharide vaccine (PPSV-23) 4/11/2015  2:51 PM    SHINGLES VACCINE 10/3/2011      Below and/or attached are the medications your provider expects you to take. Review all of your home medications and newly ordered medications with your provider and/or pharmacist. Follow medication instructions as directed by your provider and/or pharmacist.  Please keep your medication list with you and share with your provider. Update the information when medications are discontinued, doses are changed, or new medications (including over-the-counter products) are added; and carry medication information at all times in the event of emergency situations     Allergies:  ADVAIR - (reactions not documented)     BENADRYL - (reactions not documented)     CODEINE - (reactions not documented)     PCN - Rash     PROZAC - (reactions not documented)     SHELLFISH ALLERGY - Anaphylaxis     SIMVASTATIN - (reactions not documented)     ZYPREXA - (reactions not documented)               Medications  Valid as of: February 14, 2017 - 10:57 AM    Generic Name Brand Name Tablet Size Instructions for use    Albuterol Sulfate (Aero Soln) albuterol 108 (90 BASE) MCG/ACT Inhale 2 Puffs by mouth every 6 hours as needed for Shortness of Breath.        Allopurinol (Tab) ZYLOPRIM 300 MG Take 0.5 Tabs by mouth every day.        Atorvastatin Calcium (Tab) LIPITOR 40 MG Take 1 Tab by mouth every evening.        Budesonide-Formoterol Fumarate (Aerosol) SYMBICORT 80-4.5 MCG/ACT Inhale 2 Puffs by mouth 2 Times a Day. Inhale 2 puffs by mouth twice daily with spacer. Rinse mouth after each use.        Carvedilol (Tab) COREG 25 MG Take 1 Tab by mouth 2 times a day, with meals.        DiltiaZEM HCl (Tab) CARDIZEM 90 MG Take 1 Tab by mouth 2 Times a Day.        Furosemide (Tab) LASIX 40 MG Take 1 Tab by mouth every day.        Hydrocodone-Acetaminophen (Tab) NORCO 7.5-325 MG Take 1 Tab by mouth every 8 hours as needed.        Ipratropium Bromide HFA (Aero Soln) ATROVENT 17 MCG/ACT Inhale 2 Puffs by mouth every 6 hours as needed (for shortness of breath and wheezing.).        Ipratropium-Albuterol (Solution) DUONEB 0.5-2.5 (3) MG/3ML 3 mL by Nebulization route every four hours as needed for Shortness of Breath (Wheezing).        Losartan Potassium (Tab) COZAAR 100 MG TAKE ONE TABLET BY MOUTH ONE TIME DAILY           Omeprazole (CAPSULE DELAYED RELEASE) PRILOSEC 20 MG Take 1 Cap by mouth every day.        Potassium Chloride Sarahy CR (Tab CR) Kdur 20 MEQ Take 1 Tab by mouth 2 times a day.        Warfarin Sodium (Tab) COUMADIN 4 MG TAKE ONE TABLET BY MOUTH ONE TIME DAILY        .                 Medicines prescribed today were sent to:     [facility]  [place], [place] - [address]    [address] [place] [address]    Phone: [phone] Fax: [phone]    Open 24 Hours?: No      Medication refill instructions:       If your prescription bottle indicates you have medication refills left, it is not necessary to call your provider’s office. Please contact your pharmacy and they will refill your medication.    If your prescription bottle indicates you do not have any refills left, you may request refills at any time through one of the following ways: The online [email or web address] system (except Urgent Care), by calling your provider’s office, or by asking your pharmacy to contact your provider’s office with a refill request. Medication refills are processed only during regular business hours and may not be available until the next business day. Your provider may request additional information or to have a follow-up visit with you prior to refilling your medication.   *Please Note: Medication refills are assigned a new Rx number when refilled electronically. Your pharmacy may indicate that no refills were authorized even though a new prescription for the same medication is available at the pharmacy. Please request the medicine by name with the pharmacy before contacting your provider for a refill.           [email or web address] Access Code: Activation code not generated  Current [email or web address] Status: Active

## 2017-02-14 NOTE — PROGRESS NOTES
Subjective:   Hal Nava is a 71 y.o. male who presents today for one month follow up on echocardiogram and Holter monitor results.    He is a patient of Dr. Fofana in our office. Hx of HLD, paroxysmal afib, COPD, FREDDY (CPAP and O2), CAD (50% stenosis in OM), previous AVR and TAVR in '15, HTN, and obesity.    He is overall weak and tired due to coming down with a chest cold. His breathing from his COPD is his biggest concern. He is wheezing, congested, and not feeling 100%. His oxygen saturation is low normal.    He has had no sputum production or fever.     His lightheadedness was only one remote episode and has not happened since.    He has had no episodes of chest pain, palpitations, dizziness/lightheadedness, orthopnea, or peripheral edema.    Past Medical History   Diagnosis Date   • Dyslipidemia    • Gout    • Atrial fibrillation (CMS-HCC)    • HTN (hypertension)    • S/P aortic valve replacement    • Indigestion    • Breath shortness    • Snoring    • Arthritis      gout, osteo hands, feet knees   • Bronchitis 12/2014   • Pneumonia 2/2015   • Asthma      uses inhalers   • FREDDY on CPAP      cpap   • Heart murmur    • AS (aortic stenosis) of bioprosthetic valve and needs TAVR 6/14/2015   • Hypertension    • GERD (gastroesophageal reflux disease)    • CHF (congestive heart failure) (CMS-HCC)    • Allergy    • COPD (chronic obstructive pulmonary disease) with chronic bronchitis (CMS-HCC) 4/15/2016     Past Surgical History   Procedure Laterality Date   • Aortic valve replacement  2004, 2015     bovine valve   • Septoplasty     • Colonoscopy  1/3/11     colonic ileo cecal valve prominent fold-prominent lymphoid tissue   • Ethmoidectomy  4/7/2015     Performed by Gopal Guzmán M.D. at SURGERY SAME DAY [facility] ORS   • Antrostomy  4/7/2015     Performed by Gopal Guzmán M.D. at SURGERY SAME DAY [facility] ORS   • Kyphoplasty  6/15     Family History   Problem Relation Age of Onset   • Heart Disease Mother      CAD  s/p CABG   • Diabetes Mother    • Cancer Mother      breast   • Heart Attack Father 1943   • Stroke Father    • Diabetes Father      type 2   • Hypertension Sister    • Diabetes Sister      prediabetes   • Hyperlipidemia Sister      History   Smoking status   • Former Smoker -- 1.00 packs/day for 15 years   • Types: Cigarettes   • Quit date: 01/01/1984   Smokeless tobacco   • Never Used     Comment: quit in 1984     Allergies   Allergen Reactions   • Advair [Fluticasone-Salmeterol]      Headache   • Benadryl [Altaryl]      Per neurologist   • Codeine      Constipation   • Pcn [Penicillins] Rash     Per patient   • Prozac [Fluoxetine]      Excessive sedation   • Shellfish Allergy Anaphylaxis   • Simvastatin      myalgia-per pt.   • Zyprexa      Excessive sedation     Outpatient Encounter Prescriptions as of 2/14/2017   Medication Sig Dispense Refill   • albuterol 108 (90 BASE) MCG/ACT Aero Soln inhalation aerosol Inhale 2 Puffs by mouth every 6 hours as needed for Shortness of Breath. 8.5 g 5   • losartan (COZAAR) 100 MG Tab TAKE ONE TABLET BY MOUTH ONE TIME DAILY 90 Tab 1   • warfarin (COUMADIN) 4 MG Tab TAKE ONE TABLET BY MOUTH ONE TIME DAILY 90 Tab 1   • ipratropium-albuterol (DUONEB) 0.5-2.5 (3) MG/3ML nebulizer solution 3 mL by Nebulization route every four hours as needed for Shortness of Breath (Wheezing). 120 Vial 5   • hydrocodone-acetaminophen (NORCO) 7.5-325 MG per tablet Take 1 Tab by mouth every 8 hours as needed. 60 Tab 0   • budesonide-formoterol (SYMBICORT) 80-4.5 MCG/ACT Aerosol Inhale 2 Puffs by mouth 2 Times a Day. Inhale 2 puffs by mouth twice daily with spacer. Rinse mouth after each use. 1 Inhaler 5   • ipratropium (ATROVENT HFA) 17 MCG/ACT Aero Soln Inhale 2 Puffs by mouth every 6 hours as needed (for shortness of breath and wheezing.). 1 Inhaler 5   • allopurinol (ZYLOPRIM) 300 MG Tab Take 0.5 Tabs by mouth every day. 45 Tab 1   • omeprazole (PRILOSEC) 20 MG delayed-release capsule Take 1 Cap  by mouth every day. 90 Cap 1   • atorvastatin (LIPITOR) 40 MG Tab Take 1 Tab by mouth every evening. 90 Tab 1   • diltiazem (CARDIZEM) 90 MG Tab Take 1 Tab by mouth 2 Times a Day. 180 Tab 1   • carvedilol (COREG) 25 MG Tab Take 1 Tab by mouth 2 times a day, with meals. 180 Tab 3   • potassium chloride SA (K-DUR) 20 MEQ Tab CR Take 1 Tab by mouth 2 times a day. 60 Tab 11   • furosemide (LASIX) 40 MG Tab Take 1 Tab by mouth every day. 30 Tab 5   • [DISCONTINUED] clindamycin (CLEOCIN) 300 MG Cap Take 2 capsules by mouth 30-60 minutes before dental procedure. 2 Cap 0   • [DISCONTINUED] temazepam (RESTORIL) 15 MG Cap Take 15 mg by mouth.     • [DISCONTINUED] predniSONE (DELTASONE) 5 MG Tab Take 5 mg by mouth.     • [DISCONTINUED] lisinopril (PRINIVIL) 20 MG Tab Take 20 mg by mouth.     • [DISCONTINUED] hydrocodone/acetaminophen (NORCO)  MG Tab Take 1 tablet by mouth.     • [DISCONTINUED] dronedarone (MULTAQ) 400 MG Tab Take  by mouth.     • [DISCONTINUED] divalproex (DEPAKOTE) 500 MG Tablet Delayed Response Take 500 mg by mouth.     • [DISCONTINUED] clonazepam (KLONOPIN) 1 MG Tab Take 1 mg by mouth.     • [DISCONTINUED] albuterol 108 (90 BASE) MCG/ACT Aero Soln inhalation aerosol Inhale 2 Puffs by mouth every 6 hours as needed for Shortness of Breath. 8.5 g 5   • [DISCONTINUED] albuterol 108 (90 BASE) MCG/ACT Aero Soln inhalation aerosol Inhale 2 Puffs by mouth every four hours as needed for Shortness of Breath (bronchospasm). 1 Inhaler 0     No facility-administered encounter medications on file as of 2/14/2017.     Review of Systems   Constitutional: Negative for fever and malaise/fatigue.        Chest cold making him tired   Respiratory: Positive for cough, sputum production, shortness of breath and wheezing.    Cardiovascular: Negative for chest pain, palpitations, orthopnea, claudication, leg swelling and PND.   Gastrointestinal: Negative for abdominal pain.   Musculoskeletal: Negative for myalgias.  "  Neurological: Positive for weakness. Negative for dizziness.   All other systems reviewed and are negative.       Objective:   /58 mmHg  Pulse 75  Ht 1.753 m (5' 9.02\")  Wt 110.678 kg (244 lb)  BMI 36.02 kg/m2  SpO2 91%    Physical Exam   Constitutional: He is oriented to person, place, and time. He appears well-developed. No distress.   obese   HENT:   Head: Normocephalic and atraumatic.   Eyes: EOM are normal.   Neck: Normal range of motion. No JVD present.   Cardiovascular: Normal rate, regular rhythm, normal heart sounds and intact distal pulses.    No murmur heard.  Pulmonary/Chest: Accessory muscle usage present. No respiratory distress. He has decreased breath sounds in the right lower field and the left lower field. He has wheezes in the right upper field and the left upper field. He has rales in the right upper field and the left upper field.   Abdominal: Soft. Bowel sounds are normal.   Musculoskeletal: Normal range of motion. He exhibits no edema.   Neurological: He is alert and oriented to person, place, and time.   Skin: Skin is warm and dry.   Psychiatric: He has a normal mood and affect.   Nursing note and vitals reviewed.    Lab Results   Component Value Date/Time    CHOLESTEROL, 12/28/2016 11:00 AM    LDL 81 12/28/2016 11:00 AM    HDL 35* 12/28/2016 11:00 AM    TRIGLYCERIDES 88 12/28/2016 11:00 AM       Lab Results   Component Value Date/Time    SODIUM 142 12/28/2016 11:00 AM    POTASSIUM 3.7 12/28/2016 11:00 AM    CHLORIDE 105 12/28/2016 11:00 AM    CO2 28 12/28/2016 11:00 AM    GLUCOSE 95 12/28/2016 11:00 AM    BUN 22 12/28/2016 11:00 AM    CREATININE 0.92 12/28/2016 11:00 AM     Lab Results   Component Value Date/Time    ALKALINE PHOSPHATASE 92 12/28/2016 11:00 AM    AST(SGOT) 21 12/28/2016 11:00 AM    ALT(SGPT) 15 12/28/2016 11:00 AM    TOTAL BILIRUBIN 0.8 12/28/2016 11:00 AM      2017 HOLTER MONITOR RESULTS  OCCASIONAL PREMATURE VENTRICULAR COMPLEX   NO SIGNIFICANT AV BLOCK "   NO SINUS PAUSE     2017 ECHO CONCLUSIONS  Prior echocardiogram 7/24/2015.  Known bioprosthetic aortic valve that is functioning normally with   normal transvalvular gradients.Transvalvular gradients are - Peak: 34   mmHg, Mean: 17 mmHg. Dimensionless index is (0.42). No aortic   insufficiency.  Normal left ventricular systolic function.     Assessment:     1. Coronary artery disease involving native heart without angina pectoris, unspecified vessel or lesion type     2. S/P aortic valve replacement     3. Aortic valve stenosis, unspecified etiology     4. Chronic atrial fibrillation (CMS-HCC)     5. Dyslipidemia     6. Essential hypertension     7. FREDDY on CPAP     8. Chronic combined systolic and diastolic heart failure (CMS-HCC)     9. Chronic anticoagulation     10. Bronchitis, acute, with bronchospasm  albuterol 108 (90 BASE) MCG/ACT Aero Soln inhalation aerosol   11. COPD (chronic obstructive pulmonary disease) with chronic bronchitis (CMS-HCC)       Medical Decision Making:  Today's Assessment / Status / Plan:     1. CAD (50% OM residual), no angina, GALINDO with COPD. Continue statin, coreg. No ASA with coumadin.    2. Previous AVR and more recently re-do with TAVR in '15, echo shows normal gradients. Normal EF. Follow with bi-yearly echo or unless warranted.    3. Chronic afib, rate controlled. Asymptomatic. Coreg, diltiazem, and coumadin. Coumadin per [facility]-therapeutic. Consider dropping diltiazem rate if HR low or lightheadedness recurs. His holter indicated bradycardia at night but good HR variability during the day, follow clinically.    4. HLD, statin. LDL goal <70 with CAD. LDL close to goal in '16. Repeat labs this year. Check CMP and lipid before next apt.    5. HTN, great control with losartan, coreg, diltiazem, and lasix. Follow at home. Okay to cut lasix to 20 mg daily with K 20 mEq- increase as needed for worsening GALINDO.    6. FREDDY with CPAP and O2, managed by pulmonary.    7. HF, no signs of active HF.  Echo shows no systolic or diastolic dysfunction.    8. COPD, managed by pulmonary. Recommend FU with their office or urgent care/PCP if breathing worsens.    FU in clinic in 6-9 months with TT with yearly CMP and lipid; he will call for lightheadedness or concerns    Patient verbalizes understanding and agrees with the plan of care.     Collaborating MD: Bev RASHEED    Spent 45 minutes in face-to-face patient contact in which greater than 50% of the visit was spent in counseling/coordination of care of medication management, symptom management, re-assurance, discussion of medications in detail, discussed plan of care, order labs at next visit.

## 2017-02-14 NOTE — Clinical Note
[facility]   [address], [address]  [place] [address]  Phone: [phone]  Fax: [phone]              Hal Nava  1945    Encounter Date: 2/14/2017    MARIOLA Delacruz          PROGRESS NOTE:  Subjective:   Hal Nava is a 71 y.o. male who presents today for one month follow up on echocardiogram and Holter monitor results.    He is a patient of Dr. Fofana in our office. Hx of HLD, paroxysmal afib, COPD, FREDDY (CPAP and O2), CAD (50% stenosis in OM), previous AVR and TAVR in '15, HTN, and obesity.    He is overall weak and tired due to coming down with a chest cold. His breathing from his COPD is his biggest concern. He is wheezing, congested, and not feeling 100%. His oxygen saturation is low normal.    He has had no sputum production or fever.     His lightheadedness was only one remote episode and has not happened since.    He has had no episodes of chest pain, palpitations, dizziness/lightheadedness, orthopnea, or peripheral edema.    Past Medical History   Diagnosis Date   • Dyslipidemia    • Gout    • Atrial fibrillation (CMS-HCC)    • HTN (hypertension)    • S/P aortic valve replacement    • Indigestion    • Breath shortness    • Snoring    • Arthritis      gout, osteo hands, feet knees   • Bronchitis 12/2014   • Pneumonia 2/2015   • Asthma      uses inhalers   • FREDDY on CPAP      cpap   • Heart murmur    • AS (aortic stenosis) of bioprosthetic valve and needs TAVR 6/14/2015   • Hypertension    • GERD (gastroesophageal reflux disease)    • CHF (congestive heart failure) (CMS-HCC)    • Allergy    • COPD (chronic obstructive pulmonary disease) with chronic bronchitis (CMS-HCC) 4/15/2016     Past Surgical History   Procedure Laterality Date   • Aortic valve replacement  2004, 2015     bovine valve   • Septoplasty     • Colonoscopy  1/3/11     colonic ileo cecal valve prominent fold-prominent lymphoid tissue   • Ethmoidectomy   4/7/2015     Performed by Gopal Guzmán M.D. at SURGERY SAME DAY [facility] ORS   • Antrostomy  4/7/2015     Performed by Gopal Guzmán M.D. at SURGERY SAME DAY [facility] ORS   • Kyphoplasty  6/15     Family History   Problem Relation Age of Onset   • Heart Disease Mother      CAD s/p CABG   • Diabetes Mother    • Cancer Mother      breast   • Heart Attack Father 1943   • Stroke Father    • Diabetes Father      type 2   • Hypertension Sister    • Diabetes Sister      prediabetes   • Hyperlipidemia Sister      History   Smoking status   • Former Smoker -- 1.00 packs/day for 15 years   • Types: Cigarettes   • Quit date: 01/01/1984   Smokeless tobacco   • Never Used     Comment: quit in 1984     Allergies   Allergen Reactions   • Advair [Fluticasone-Salmeterol]      Headache   • Benadryl [Altaryl]      Per neurologist   • Codeine      Constipation   • Pcn [Penicillins] Rash     Per patient   • Prozac [Fluoxetine]      Excessive sedation   • Shellfish Allergy Anaphylaxis   • Simvastatin      myalgia-per pt.   • Zyprexa      Excessive sedation     Outpatient Encounter Prescriptions as of 2/14/2017   Medication Sig Dispense Refill   • albuterol 108 (90 BASE) MCG/ACT Aero Soln inhalation aerosol Inhale 2 Puffs by mouth every 6 hours as needed for Shortness of Breath. 8.5 g 5   • losartan (COZAAR) 100 MG Tab TAKE ONE TABLET BY MOUTH ONE TIME DAILY 90 Tab 1   • warfarin (COUMADIN) 4 MG Tab TAKE ONE TABLET BY MOUTH ONE TIME DAILY 90 Tab 1   • ipratropium-albuterol (DUONEB) 0.5-2.5 (3) MG/3ML nebulizer solution 3 mL by Nebulization route every four hours as needed for Shortness of Breath (Wheezing). 120 Vial 5   • hydrocodone-acetaminophen (NORCO) 7.5-325 MG per tablet Take 1 Tab by mouth every 8 hours as needed. 60 Tab 0   • budesonide-formoterol (SYMBICORT) 80-4.5 MCG/ACT Aerosol Inhale 2 Puffs by mouth 2 Times a Day. Inhale 2 puffs by mouth twice daily with spacer. Rinse mouth after each use. 1 Inhaler 5   • ipratropium (ATROVENT  HFA) 17 MCG/ACT Aero Soln Inhale 2 Puffs by mouth every 6 hours as needed (for shortness of breath and wheezing.). 1 Inhaler 5   • allopurinol (ZYLOPRIM) 300 MG Tab Take 0.5 Tabs by mouth every day. 45 Tab 1   • omeprazole (PRILOSEC) 20 MG delayed-release capsule Take 1 Cap by mouth every day. 90 Cap 1   • atorvastatin (LIPITOR) 40 MG Tab Take 1 Tab by mouth every evening. 90 Tab 1   • diltiazem (CARDIZEM) 90 MG Tab Take 1 Tab by mouth 2 Times a Day. 180 Tab 1   • carvedilol (COREG) 25 MG Tab Take 1 Tab by mouth 2 times a day, with meals. 180 Tab 3   • potassium chloride SA (K-DUR) 20 MEQ Tab CR Take 1 Tab by mouth 2 times a day. 60 Tab 11   • furosemide (LASIX) 40 MG Tab Take 1 Tab by mouth every day. 30 Tab 5   • [DISCONTINUED] clindamycin (CLEOCIN) 300 MG Cap Take 2 capsules by mouth 30-60 minutes before dental procedure. 2 Cap 0   • [DISCONTINUED] temazepam (RESTORIL) 15 MG Cap Take 15 mg by mouth.     • [DISCONTINUED] predniSONE (DELTASONE) 5 MG Tab Take 5 mg by mouth.     • [DISCONTINUED] lisinopril (PRINIVIL) 20 MG Tab Take 20 mg by mouth.     • [DISCONTINUED] hydrocodone/acetaminophen (NORCO)  MG Tab Take 1 tablet by mouth.     • [DISCONTINUED] dronedarone (MULTAQ) 400 MG Tab Take  by mouth.     • [DISCONTINUED] divalproex (DEPAKOTE) 500 MG Tablet Delayed Response Take 500 mg by mouth.     • [DISCONTINUED] clonazepam (KLONOPIN) 1 MG Tab Take 1 mg by mouth.     • [DISCONTINUED] albuterol 108 (90 BASE) MCG/ACT Aero Soln inhalation aerosol Inhale 2 Puffs by mouth every 6 hours as needed for Shortness of Breath. 8.5 g 5   • [DISCONTINUED] albuterol 108 (90 BASE) MCG/ACT Aero Soln inhalation aerosol Inhale 2 Puffs by mouth every four hours as needed for Shortness of Breath (bronchospasm). 1 Inhaler 0     No facility-administered encounter medications on file as of 2/14/2017.     Review of Systems   Constitutional: Negative for fever and malaise/fatigue.        Chest cold making him tired   Respiratory:  "Positive for cough, sputum production, shortness of breath and wheezing.    Cardiovascular: Negative for chest pain, palpitations, orthopnea, claudication, leg swelling and PND.   Gastrointestinal: Negative for abdominal pain.   Musculoskeletal: Negative for myalgias.   Neurological: Positive for weakness. Negative for dizziness.   All other systems reviewed and are negative.       Objective:   /58 mmHg  Pulse 75  Ht 1.753 m (5' 9.02\")  Wt 110.678 kg (244 lb)  BMI 36.02 kg/m2  SpO2 91%    Physical Exam   Constitutional: He is oriented to person, place, and time. He appears well-developed. No distress.   obese   HENT:   Head: Normocephalic and atraumatic.   Eyes: EOM are normal.   Neck: Normal range of motion. No JVD present.   Cardiovascular: Normal rate, regular rhythm, normal heart sounds and intact distal pulses.    No murmur heard.  Pulmonary/Chest: Accessory muscle usage present. No respiratory distress. He has decreased breath sounds in the right lower field and the left lower field. He has wheezes in the right upper field and the left upper field. He has rales in the right upper field and the left upper field.   Abdominal: Soft. Bowel sounds are normal.   Musculoskeletal: Normal range of motion. He exhibits no edema.   Neurological: He is alert and oriented to person, place, and time.   Skin: Skin is warm and dry.   Psychiatric: He has a normal mood and affect.   Nursing note and vitals reviewed.    Lab Results   Component Value Date/Time    CHOLESTEROL, 12/28/2016 11:00 AM    LDL 81 12/28/2016 11:00 AM    HDL 35* 12/28/2016 11:00 AM    TRIGLYCERIDES 88 12/28/2016 11:00 AM       Lab Results   Component Value Date/Time    SODIUM 142 12/28/2016 11:00 AM    POTASSIUM 3.7 12/28/2016 11:00 AM    CHLORIDE 105 12/28/2016 11:00 AM    CO2 28 12/28/2016 11:00 AM    GLUCOSE 95 12/28/2016 11:00 AM    BUN 22 12/28/2016 11:00 AM    CREATININE 0.92 12/28/2016 11:00 AM     Lab Results   Component Value " Date/Time    ALKALINE PHOSPHATASE 92 12/28/2016 11:00 AM    AST(SGOT) 21 12/28/2016 11:00 AM    ALT(SGPT) 15 12/28/2016 11:00 AM    TOTAL BILIRUBIN 0.8 12/28/2016 11:00 AM      2017 HOLTER MONITOR RESULTS  OCCASIONAL PREMATURE VENTRICULAR COMPLEX   NO SIGNIFICANT AV BLOCK   NO SINUS PAUSE     2017 ECHO CONCLUSIONS  Prior echocardiogram 7/24/2015.  Known bioprosthetic aortic valve that is functioning normally with   normal transvalvular gradients.Transvalvular gradients are - Peak: 34   mmHg, Mean: 17 mmHg. Dimensionless index is (0.42). No aortic   insufficiency.  Normal left ventricular systolic function.     Assessment:     1. Coronary artery disease involving native heart without angina pectoris, unspecified vessel or lesion type     2. S/P aortic valve replacement     3. Aortic valve stenosis, unspecified etiology     4. Chronic atrial fibrillation (CMS-HCC)     5. Dyslipidemia     6. Essential hypertension     7. FREDDY on CPAP     8. Chronic combined systolic and diastolic heart failure (CMS-HCC)     9. Chronic anticoagulation     10. Bronchitis, acute, with bronchospasm  albuterol 108 (90 BASE) MCG/ACT Aero Soln inhalation aerosol   11. COPD (chronic obstructive pulmonary disease) with chronic bronchitis (CMS-HCC)       Medical Decision Making:  Today's Assessment / Status / Plan:     1. CAD (50% OM residual), no angina, GALINDO with COPD. Continue statin, coreg. No ASA with coumadin.    2. Previous AVR and more recently re-do with TAVR in '15, echo shows normal gradients. Normal EF. Follow with bi-yearly echo or unless warranted.    3. Chronic afib, rate controlled. Asymptomatic. Coreg, diltiazem, and coumadin. Coumadin per [facility]-therapeutic. Consider dropping diltiazem rate if HR low or lightheadedness recurs. His holter indicated bradycardia at night but good HR variability during the day, follow clinically.    4. HLD, statin. LDL goal <70 with CAD. LDL close to goal in '16. Repeat labs this year. Check CMP and  lipid before next apt.    5. HTN, great control with losartan, coreg, diltiazem, and lasix. Follow at home. Okay to cut lasix to 20 mg daily with K 20 mEq- increase as needed for worsening GALINDO.    6. FREDDY with CPAP and O2, managed by pulmonary.    7. HF, no signs of active HF. Echo shows no systolic or diastolic dysfunction.    8. COPD, managed by pulmonary. Recommend FU with their office or urgent care/PCP if breathing worsens.    FU in clinic in 6-9 months with TT with yearly CMP and lipid; he will call for lightheadedness or concerns    Patient verbalizes understanding and agrees with the plan of care.     Collaborating MD: Bev RASHEED    Spent 45 minutes in face-to-face patient contact in which greater than 50% of the visit was spent in counseling/coordination of care of medication management, symptom management, re-assurance, discussion of medications in detail, discussed plan of care, order labs at next visit.          No Recipients

## 2017-02-14 NOTE — TELEPHONE ENCOUNTER
Please Call Patient and See If He Needs to Be Seen. He Was Seen by Cardiology and Has a Respiratory Infection. He Is Due To Be Seen by Me in Approximately One Month Anyway.

## 2017-02-15 ENCOUNTER — OFFICE VISIT (OUTPATIENT)
Dept: URGENT CARE | Facility: PHYSICIAN GROUP | Age: 72
End: 2017-02-15
Payer: MEDICARE

## 2017-02-15 ENCOUNTER — HOSPITAL ENCOUNTER (OUTPATIENT)
Dept: RADIOLOGY | Facility: MEDICAL CENTER | Age: 72
End: 2017-02-15
Attending: NURSE PRACTITIONER
Payer: MEDICARE

## 2017-02-15 ENCOUNTER — TELEPHONE (OUTPATIENT)
Dept: MEDICAL GROUP | Facility: PHYSICIAN GROUP | Age: 72
End: 2017-02-15

## 2017-02-15 VITALS
RESPIRATION RATE: 16 BRPM | SYSTOLIC BLOOD PRESSURE: 122 MMHG | HEART RATE: 67 BPM | HEIGHT: 69 IN | TEMPERATURE: 96.7 F | WEIGHT: 243 LBS | BODY MASS INDEX: 35.99 KG/M2 | OXYGEN SATURATION: 90 % | DIASTOLIC BLOOD PRESSURE: 78 MMHG

## 2017-02-15 DIAGNOSIS — J01.01 ACUTE RECURRENT MAXILLARY SINUSITIS: ICD-10-CM

## 2017-02-15 DIAGNOSIS — J20.9 ACUTE BRONCHITIS WITH ASTHMA WITH ACUTE EXACERBATION: ICD-10-CM

## 2017-02-15 DIAGNOSIS — J98.01 ACUTE BRONCHOSPASM: ICD-10-CM

## 2017-02-15 DIAGNOSIS — J45.901 ACUTE BRONCHITIS WITH ASTHMA WITH ACUTE EXACERBATION: ICD-10-CM

## 2017-02-15 DIAGNOSIS — R05.9 COUGH: ICD-10-CM

## 2017-02-15 PROCEDURE — 99214 OFFICE O/P EST MOD 30 MIN: CPT | Mod: 25 | Performed by: NURSE PRACTITIONER

## 2017-02-15 PROCEDURE — 94640 AIRWAY INHALATION TREATMENT: CPT | Performed by: NURSE PRACTITIONER

## 2017-02-15 PROCEDURE — 94761 N-INVAS EAR/PLS OXIMETRY MLT: CPT | Mod: 59 | Performed by: NURSE PRACTITIONER

## 2017-02-15 PROCEDURE — 71020 DX-CHEST-2 VIEWS: CPT

## 2017-02-15 PROCEDURE — 99999 PR NO CHARGE: CPT | Performed by: NURSE PRACTITIONER

## 2017-02-15 RX ORDER — IPRATROPIUM BROMIDE AND ALBUTEROL SULFATE 2.5; .5 MG/3ML; MG/3ML
3 SOLUTION RESPIRATORY (INHALATION) ONCE
Status: COMPLETED | OUTPATIENT
Start: 2017-02-15 | End: 2017-02-15

## 2017-02-15 RX ORDER — LEVOFLOXACIN 500 MG/1
500 TABLET, FILM COATED ORAL DAILY
Qty: 7 TAB | Refills: 0 | Status: SHIPPED | OUTPATIENT
Start: 2017-02-15 | End: 2017-02-22

## 2017-02-15 RX ORDER — METHYLPREDNISOLONE 4 MG/1
4 TABLET ORAL DAILY
Qty: 1 KIT | Refills: 0 | Status: SHIPPED | OUTPATIENT
Start: 2017-02-15 | End: 2017-05-22

## 2017-02-15 RX ADMIN — IPRATROPIUM BROMIDE AND ALBUTEROL SULFATE 3 ML: 2.5; .5 SOLUTION RESPIRATORY (INHALATION) at 10:53

## 2017-02-15 NOTE — MR AVS SNAPSHOT
"        Hal Nava   2/15/2017 10:25 AM   Office Visit   MRN: [id number]    Department:  [facility]   Dept Phone:  [phone]    Description:  Male : 1945   Provider:  MARIOLA Pichardo           Reason for Visit     Sinus Problem x 1 week, some trouble breathing      Allergies as of 2/15/2017     Allergen Noted Reactions    Advair [Fluticasone-Salmeterol] 2014       Headache    Benadryl [Altaryl] 2015       Per neurologist    Codeine 2014       Constipation    Pcn [Penicillins] 2014   Rash    Per patient    Prozac [Fluoxetine] 2014       Excessive sedation    Shellfish Allergy 2015   Anaphylaxis    Simvastatin 10/19/2016       myalgia-per pt.    Zyprexa 2014       Excessive sedation      You were diagnosed with     Acute bronchitis with asthma with acute exacerbation   [7429376]       Acute recurrent maxillary sinusitis   [[id number]]         Vital Signs     Blood Pressure Pulse Temperature Respirations Height Weight    122/78 mmHg 67 35.9 °C (96.7 °F) 16 1.753 m (5' 9\") 110.224 kg (243 lb)    Body Mass Index Oxygen Saturation Smoking Status             35.87 kg/m2 90% Former Smoker         Basic Information     Date Of Birth Sex Race Ethnicity Preferred Language    1945 Male White Non- English      Your appointments     2017  9:20 AM   Established Patient with Sandy Sahni M.D.   [facility] (--)    [address]  [address]  [facility] [id number] [phone]           You will be receiving a confirmation call a few days before your appointment from our automated call confirmation system.              Problem List              ICD-10-CM Priority Class Noted - Resolved    Dyslipidemia E78.5 Medium  Unknown - Present    Gout M10.9 Low  Unknown - Present    Atrial fibrillation (CMS-HCC) I48.91 Medium  Unknown - Present    S/P aortic valve replacement Z95.2 Medium  Unknown - Present    GERD (gastroesophageal reflux " disease) K21.9 Low  9/11/2014 - Present    FREDDY on CPAP G47.33 Medium  Unknown - Present    Chronic anticoagulation Z79.01 Low  1/13/2015 - Present    Chronic maxillary sinusitis J32.0 Low  4/7/2015 - Present    CAD (coronary artery disease) I25.10 Medium  4/8/2015 - Present    Hx of Hyperglycemia R73.9 Low  4/9/2015 - Present    Anemia D64.9 Low  4/9/2015 - Present    Chronic combined systolic and diastolic heart failure (CMS-HCC) I50.42 Medium  4/16/2015 - Present    AS (aortic stenosis) of bioprosthetic valve and needs TAVR I35.0 Medium  6/14/2015 - Present    Essential hypertension I10 Medium  6/23/2015 - Present    COPD (chronic obstructive pulmonary disease) with chronic bronchitis (CMS-HCC) J44.9 Low  4/15/2016 - Present      Health Maintenance        Date Due Completion Dates    IMM DTaP/Tdap/Td Vaccine (1 - Tdap) 9/1/1964 ---    COLONOSCOPY 1/3/2021 1/3/2011 (Done)    Override on 1/3/2011: Done            Current Immunizations     13-VALENT PCV PREVNAR 4/15/2016  8:53 AM    Influenza Vaccine Adult HD 10/26/2016    Influenza Vaccine Quad Inj (Preserved) 10/20/2015, 1/15/2015    Pneumococcal polysaccharide vaccine (PPSV-23) 4/11/2015  2:51 PM    SHINGLES VACCINE 10/3/2011      Below and/or attached are the medications your provider expects you to take. Review all of your home medications and newly ordered medications with your provider and/or pharmacist. Follow medication instructions as directed by your provider and/or pharmacist. Please keep your medication list with you and share with your provider. Update the information when medications are discontinued, doses are changed, or new medications (including over-the-counter products) are added; and carry medication information at all times in the event of emergency situations     Allergies:  ADVAIR - (reactions not documented)     BENADRYL - (reactions not documented)     CODEINE - (reactions not documented)     PCN - Rash     PROZAC - (reactions not  documented)     SHELLFISH ALLERGY - Anaphylaxis     SIMVASTATIN - (reactions not documented)     ZYPREXA - (reactions not documented)               Medications  Valid as of: February 15, 2017 -  4:46 PM    Generic Name Brand Name Tablet Size Instructions for use    Albuterol Sulfate (Aero Soln) albuterol 108 (90 BASE) MCG/ACT Inhale 2 Puffs by mouth every 6 hours as needed for Shortness of Breath.        Allopurinol (Tab) ZYLOPRIM 300 MG Take 0.5 Tabs by mouth every day.        Atorvastatin Calcium (Tab) LIPITOR 40 MG Take 1 Tab by mouth every evening.        Budesonide-Formoterol Fumarate (Aerosol) SYMBICORT 80-4.5 MCG/ACT Inhale 2 Puffs by mouth 2 Times a Day. Inhale 2 puffs by mouth twice daily with spacer. Rinse mouth after each use.        Carvedilol (Tab) COREG 25 MG Take 1 Tab by mouth 2 times a day, with meals.        DiltiaZEM HCl (Tab) CARDIZEM 90 MG Take 1 Tab by mouth 2 Times a Day.        Furosemide (Tab) LASIX 40 MG Take 1 Tab by mouth every day.        Hydrocodone-Acetaminophen (Tab) NORCO 7.5-325 MG Take 1 Tab by mouth every 8 hours as needed.        Ipratropium Bromide HFA (Aero Soln) ATROVENT 17 MCG/ACT Inhale 2 Puffs by mouth every 6 hours as needed (for shortness of breath and wheezing.).        Ipratropium-Albuterol (Solution) DUONEB 0.5-2.5 (3) MG/3ML 3 mL by Nebulization route every four hours as needed for Shortness of Breath (Wheezing).        LevoFLOXacin (Tab) LEVAQUIN 500 MG Take 1 Tab by mouth every day for 7 days.        Losartan Potassium (Tab) COZAAR 100 MG TAKE ONE TABLET BY MOUTH ONE TIME DAILY        MethylPREDNISolone (Tablet Therapy Pack) MEDROL DOSEPAK 4 MG Take 1 Tab by mouth every day.        Omeprazole (CAPSULE DELAYED RELEASE) PRILOSEC 20 MG Take 1 Cap by mouth every day.        Potassium Chloride Sarahy CR (Tab CR) Kdur 20 MEQ Take 1 Tab by mouth 2 times a day.        Warfarin Sodium (Tab) COUMADIN 4 MG TAKE ONE TABLET BY MOUTH ONE TIME DAILY        .                    Medicines prescribed today were sent to:     [facility] [place], [place] - [address]    [address] [address]    Phone: [phone] Fax: [phone]    Open 24 Hours?: No      Medication refill instructions:       If your prescription bottle indicates you have medication refills left, it is not necessary to call your provider’s office. Please contact your pharmacy and they will refill your medication.    If your prescription bottle indicates you do not have any refills left, you may request refills at any time through one of the following ways: The online [email or web address] system (except Urgent Care), by calling your provider’s office, or by asking your pharmacy to contact your provider’s office with a refill request. Medication refills are processed only during regular business hours and may not be available until the next business day. Your provider may request additional information or to have a follow-up visit with you prior to refilling your medication.   *Please Note: Medication refills are assigned a new Rx number when refilled electronically. Your pharmacy may indicate that no refills were authorized even though a new prescription for the same medication is available at the pharmacy. Please request the medicine by name with the pharmacy before contacting your provider for a refill.        Your To Do List     Future Labs/Procedures Complete By Expires    DX-CHEST-2 VIEWS  As directed 2/15/2018         [email or web address] Access Code: Activation code not generated  Current [email or web address] Status: Active

## 2017-02-15 NOTE — PROGRESS NOTES
Chief Complaint   Patient presents with   • Sinus Problem     x 1 week, some trouble breathing       HISTORY OF PRESENT ILLNESS: Patient is a 71 y.o. male who presents today due to symptoms that started one week ago. Pt reports a productive cough without blood in sputum. Reports associated mild ear pressure, nasal congestion, sinus pressure, fatigue, shortness of breath, and wheezing. Denies fever, sore throat, chest pain. Admits to a history of asthma, has a nebulizer and inhaler at home he has been taking with some improvement, last taken yesterday. Was seen by his cardiology provider yesterday, mentioned his symptoms and was told to come to  to day for further evaluation. He completed a one month course of doxycycline for a recent dental infection. Admits to a history of recurrent sinus infections.     Patient Active Problem List    Diagnosis Date Noted   • Essential hypertension 06/23/2015     Priority: Medium   • AS (aortic stenosis) of bioprosthetic valve and needs TAVR 06/14/2015     Priority: Medium   • Chronic combined systolic and diastolic heart failure (CMS-HCC) 04/16/2015     Priority: Medium   • CAD (coronary artery disease) 04/08/2015     Priority: Medium   • Dyslipidemia      Priority: Medium   • Atrial fibrillation (CMS-HCC)      Priority: Medium   • S/P aortic valve replacement      Priority: Medium   • FREDDY on CPAP      Priority: Medium   • COPD (chronic obstructive pulmonary disease) with chronic bronchitis (CMS-HCC) 04/15/2016     Priority: Low   • Hx of Hyperglycemia 04/09/2015     Priority: Low   • Anemia 04/09/2015     Priority: Low   • Chronic maxillary sinusitis 04/07/2015     Priority: Low   • Chronic anticoagulation 01/13/2015     Priority: Low   • GERD (gastroesophageal reflux disease) 09/11/2014     Priority: Low   • Gout      Priority: Low       Allergies:Advair; Benadryl; Codeine; Pcn; Prozac; Shellfish allergy; Simvastatin; and Zyprexa    Current Outpatient Prescriptions Ordered in  Epic   Medication Sig Dispense Refill   • losartan (COZAAR) 100 MG Tab TAKE ONE TABLET BY MOUTH ONE TIME DAILY 90 Tab 1   • warfarin (COUMADIN) 4 MG Tab TAKE ONE TABLET BY MOUTH ONE TIME DAILY 90 Tab 1   • ipratropium-albuterol (DUONEB) 0.5-2.5 (3) MG/3ML nebulizer solution 3 mL by Nebulization route every four hours as needed for Shortness of Breath (Wheezing). 120 Vial 5   • budesonide-formoterol (SYMBICORT) 80-4.5 MCG/ACT Aerosol Inhale 2 Puffs by mouth 2 Times a Day. Inhale 2 puffs by mouth twice daily with spacer. Rinse mouth after each use. 1 Inhaler 5   • ipratropium (ATROVENT HFA) 17 MCG/ACT Aero Soln Inhale 2 Puffs by mouth every 6 hours as needed (for shortness of breath and wheezing.). 1 Inhaler 5   • allopurinol (ZYLOPRIM) 300 MG Tab Take 0.5 Tabs by mouth every day. 45 Tab 1   • omeprazole (PRILOSEC) 20 MG delayed-release capsule Take 1 Cap by mouth every day. 90 Cap 1   • atorvastatin (LIPITOR) 40 MG Tab Take 1 Tab by mouth every evening. 90 Tab 1   • diltiazem (CARDIZEM) 90 MG Tab Take 1 Tab by mouth 2 Times a Day. 180 Tab 1   • carvedilol (COREG) 25 MG Tab Take 1 Tab by mouth 2 times a day, with meals. 180 Tab 3   • potassium chloride SA (K-DUR) 20 MEQ Tab CR Take 1 Tab by mouth 2 times a day. 60 Tab 11   • furosemide (LASIX) 40 MG Tab Take 1 Tab by mouth every day. 30 Tab 5   • albuterol 108 (90 BASE) MCG/ACT Aero Soln inhalation aerosol Inhale 2 Puffs by mouth every 6 hours as needed for Shortness of Breath. 8.5 g 5   • hydrocodone-acetaminophen (NORCO) 7.5-325 MG per tablet Take 1 Tab by mouth every 8 hours as needed. 60 Tab 0     Current Facility-Administered Medications Ordered in Epic   Medication Dose Route Frequency Provider Last Rate Last Dose   • ipratropium-albuterol (DUONEB) nebulizer solution 3 mL  3 mL Nebulization Once Pauline Farrell A.P.R.N.           Past Medical History   Diagnosis Date   • Dyslipidemia    • Gout    • Atrial fibrillation (CMS-HCC)    • HTN (hypertension)    • S/P  aortic valve replacement    • Indigestion    • Breath shortness    • Snoring    • Arthritis      gout, osteo hands, feet knees   • Bronchitis 2014   • Pneumonia 2015   • Asthma      uses inhalers   • FREDDY on CPAP      cpap   • Heart murmur    • AS (aortic stenosis) of bioprosthetic valve and needs TAVR 2015   • Hypertension    • GERD (gastroesophageal reflux disease)    • CHF (congestive heart failure) (CMS-HCC)    • Allergy    • COPD (chronic obstructive pulmonary disease) with chronic bronchitis (CMS-HCC) 4/15/2016       Social History   Substance Use Topics   • Smoking status: Former Smoker -- 1.00 packs/day for 15 years     Types: Cigarettes     Quit date: 1984   • Smokeless tobacco: Never Used      Comment: quit in    • Alcohol Use: 1.2 oz/week     1 Glasses of wine, 1 Cans of beer per week      Comment: rarely       Family Status   Relation Status Death Age   • Mother  81 y.o     CHF   • Father  76 y.o.     complications from hip fracture/CHF   • Sister Alive      Family History   Problem Relation Age of Onset   • Heart Disease Mother      CAD s/p CABG   • Diabetes Mother    • Cancer Mother      breast   • Heart Attack Father    • Stroke Father    • Diabetes Father      type 2   • Hypertension Sister    • Diabetes Sister      prediabetes   • Hyperlipidemia Sister        ROS:  Review of Systems   Constitutional: Positive for fatigue. Negative for fever, weight loss and malaise.  HENT: Positive for congestion, sinus pressure, and ear pressure. Negative for sore throat, nosebleeds, and neck pain.    Eyes: Negative for vision changes.   Cardiovascular: Negative for chest pain, palpitations, orthopnea and leg swelling.   Respiratory: Positive for cough, sputum production, shortness of breath and wheezing.   Gastrointestinal: Negative for abdominal pain, nausea, vomiting or diarrhea.   Skin: Negative for rash, diaphoresis.     Exam:  Blood pressure 122/78, pulse 67, temperature  "35.9 °C (96.7 °F), resp. rate 16, height 1.753 m (5' 9\"), weight 110.224 kg (243 lb), SpO2 90 %.  General: well-nourished, well-developed male in NAD  Head: normocephalic, atraumatic  Eyes: PERRLA, EOM within normal limits, no conjunctival injection, no scleral icterus, visual fields and acuity grossly intact.  Ears: normal shape and symmetry, no tenderness, no discharge. External canals are without any significant edema or erythema. Tympanic membranes are without any inflammation, no effusion. Gross auditory acuity is intact.  Nose: symmetrical without tenderness, mild discharge, erythema present bilateral nares. Bilateral frontal sinus tenderness.   Mouth/Throat: reasonable hygiene, no exudates or tonsillar enlargement. Erythema present.   Neck: no masses, range of motion within normal limits, no tracheal deviation.  Lymph: mild cervical adenopathy. No supraclavicular adenopathy.   Neuro: alert and oriented. Cranial nerves 1-12 grossly intact.   Cardiovascular: regular rate and rhythm without murmurs, rubs, or gallops. No edema.   Pulmonary: no distress. Chest is symmetrical with respiration, no crackles or rhonchi. Expiratory wheezes throughout.   Musculoskeletal: appropriate muscle tone, gait is stable.  Skin: warm, dry, intact, no clubbing, no cyanosis.   Psych: appropriate mood, affect, judgement.         Assessment/Plan:  1. Acute bronchitis with asthma with acute exacerbation  ipratropium-albuterol (DUONEB) nebulizer solution 3 mL    DX-CHEST-2 VIEWS    MethylPREDNISolone (MEDROL DOSEPAK) 4 MG Tablet Therapy Pack    levofloxacin (LEVAQUIN) 500 MG tablet   2. Acute recurrent maxillary sinusitis  DX-CHEST-2 VIEWS    levofloxacin (LEVAQUIN) 500 MG tablet         DX chest reviewed by myself, radiology reading \"Stable chest without evidence of acute cardiopulmonary process.\"      The patient was given a nebulizer treatment in office, reported improvement, less wheezes ausculted, oxygen saturations dvaon to 92%. "   Levaquin for suspected bronchitis and sinusitis, potential side effects of medication discussed. Medrol dose pack, as directed, with food. Have INR checked this week, follow up accordingly.   Rest, increase fluids, hand and respiratory hygiene. Home asthma medications as directed.   May take OTC medications as directed for symptom relief. Honey for cough.   Supportive care, differential diagnoses, and indications for immediate follow-up discussed with patient.   Pathogenesis of diagnosis discussed including typical length and natural progression.  Instructed to return to clinic or nearest emergency department for any change in condition, further concerns, or worsening of symptoms.  Patient states understanding of the plan of care and discharge instructions.  Instructed to make an appointment with their primary care provider in the next 3-7 days if not significantly improving and for further care.         Please note that this dictation was created using voice recognition software. I have made every reasonable attempt to correct obvious errors, but I expect that there are errors of grammar and possibly content that I did not discover before finalizing the note.      YOLIE Rodarte.

## 2017-02-16 NOTE — TELEPHONE ENCOUNTER
Spoke with patient's wife. Patient was seen at urgent care today for acute sinusitis and was put on prednisone and Levaquin. Discussed potential for INR to be overly range on both medications. We will do follow-up INR  On 2/21/17. Advised call if he has any spontaneous bleeding or bruising.

## 2017-02-16 NOTE — TELEPHONE ENCOUNTER
Pt was put on Levaquin and will call if he doesn't get better. He will call to schedule follow up./ap

## 2017-02-21 ENCOUNTER — HOSPITAL ENCOUNTER (OUTPATIENT)
Dept: LAB | Facility: MEDICAL CENTER | Age: 72
End: 2017-02-21
Attending: FAMILY MEDICINE
Payer: MEDICARE

## 2017-02-21 DIAGNOSIS — I48.0 PAROXYSMAL ATRIAL FIBRILLATION (HCC): ICD-10-CM

## 2017-02-21 LAB
INR PPP: 3.66 (ref 0.87–1.13)
PROTHROMBIN TIME: 37.5 SEC (ref 12–14.6)

## 2017-02-21 PROCEDURE — 36415 COLL VENOUS BLD VENIPUNCTURE: CPT

## 2017-02-21 PROCEDURE — 85610 PROTHROMBIN TIME: CPT

## 2017-03-14 ENCOUNTER — TELEPHONE (OUTPATIENT)
Dept: MEDICAL GROUP | Facility: PHYSICIAN GROUP | Age: 72
End: 2017-03-14

## 2017-03-14 ENCOUNTER — HOSPITAL ENCOUNTER (OUTPATIENT)
Dept: LAB | Facility: MEDICAL CENTER | Age: 72
End: 2017-03-14
Attending: FAMILY MEDICINE
Payer: MEDICARE

## 2017-03-14 DIAGNOSIS — I48.0 PAROXYSMAL ATRIAL FIBRILLATION (HCC): ICD-10-CM

## 2017-03-14 LAB
INR PPP: 1.93 (ref 0.87–1.13)
PROTHROMBIN TIME: 22.7 SEC (ref 12–14.6)

## 2017-03-14 PROCEDURE — 36415 COLL VENOUS BLD VENIPUNCTURE: CPT

## 2017-03-14 PROCEDURE — 85610 PROTHROMBIN TIME: CPT

## 2017-03-15 NOTE — TELEPHONE ENCOUNTER
----- Message from Sandy Sahni M.D. sent at 3/14/2017  5:10 PM PDT -----  INR improved and decreased from 3.66-1.93. Our goal is between 2-3. Since this is very close to goal we will continue the same dose of Coumadin 4 mg for 5 days and 2 mg for 2 days. Recheck PT/INR in 2 weeks.

## 2017-03-25 ENCOUNTER — OFFICE VISIT (OUTPATIENT)
Dept: URGENT CARE | Facility: PHYSICIAN GROUP | Age: 72
End: 2017-03-25
Payer: MEDICARE

## 2017-03-25 VITALS
RESPIRATION RATE: 14 BRPM | OXYGEN SATURATION: 95 % | TEMPERATURE: 97.9 F | WEIGHT: 246 LBS | BODY MASS INDEX: 35.22 KG/M2 | HEIGHT: 70 IN | HEART RATE: 54 BPM | SYSTOLIC BLOOD PRESSURE: 128 MMHG | DIASTOLIC BLOOD PRESSURE: 74 MMHG

## 2017-03-25 DIAGNOSIS — J44.1 COPD EXACERBATION (HCC): ICD-10-CM

## 2017-03-25 DIAGNOSIS — J20.9 ACUTE BRONCHITIS, UNSPECIFIED ORGANISM: ICD-10-CM

## 2017-03-25 PROCEDURE — 3017F COLORECTAL CA SCREEN DOC REV: CPT | Performed by: NURSE PRACTITIONER

## 2017-03-25 PROCEDURE — G8482 FLU IMMUNIZE ORDER/ADMIN: HCPCS | Performed by: NURSE PRACTITIONER

## 2017-03-25 PROCEDURE — G8419 CALC BMI OUT NRM PARAM NOF/U: HCPCS | Performed by: NURSE PRACTITIONER

## 2017-03-25 PROCEDURE — 1101F PT FALLS ASSESS-DOCD LE1/YR: CPT | Performed by: NURSE PRACTITIONER

## 2017-03-25 PROCEDURE — G8598 ASA/ANTIPLAT THER USED: HCPCS | Performed by: NURSE PRACTITIONER

## 2017-03-25 PROCEDURE — 1036F TOBACCO NON-USER: CPT | Performed by: NURSE PRACTITIONER

## 2017-03-25 PROCEDURE — 4040F PNEUMOC VAC/ADMIN/RCVD: CPT | Performed by: NURSE PRACTITIONER

## 2017-03-25 PROCEDURE — 99214 OFFICE O/P EST MOD 30 MIN: CPT | Performed by: NURSE PRACTITIONER

## 2017-03-25 PROCEDURE — G8432 DEP SCR NOT DOC, RNG: HCPCS | Performed by: NURSE PRACTITIONER

## 2017-03-25 RX ORDER — LEVOFLOXACIN 500 MG/1
500 TABLET, FILM COATED ORAL DAILY
Qty: 5 TAB | Refills: 0 | Status: SHIPPED | OUTPATIENT
Start: 2017-03-25 | End: 2017-05-22

## 2017-03-25 RX ORDER — ALBUTEROL SULFATE 90 UG/1
2 AEROSOL, METERED RESPIRATORY (INHALATION) EVERY 6 HOURS PRN
Qty: 8.5 G | Refills: 0 | Status: SHIPPED | OUTPATIENT
Start: 2017-03-25 | End: 2017-05-22

## 2017-03-25 RX ORDER — METHYLPREDNISOLONE 4 MG/1
4 TABLET ORAL DAILY
Qty: 1 KIT | Refills: 0 | Status: SHIPPED | OUTPATIENT
Start: 2017-03-25 | End: 2017-05-22

## 2017-03-25 ASSESSMENT — ENCOUNTER SYMPTOMS
SWOLLEN GLANDS: 0
COUGH: 1
DIZZINESS: 0
NAUSEA: 0
SHORTNESS OF BREATH: 0
ABDOMINAL PAIN: 0
NECK PAIN: 0
SINUS PAIN: 0
FEVER: 0
VOMITING: 0
WHEEZING: 1
RHINORRHEA: 0
DIARRHEA: 0
HEADACHES: 0
SORE THROAT: 0
CHILLS: 0

## 2017-03-25 ASSESSMENT — LIFESTYLE VARIABLES: SUBSTANCE_ABUSE: 0

## 2017-03-25 NOTE — PROGRESS NOTES
Subjective:      Hal Nava is a 71 y.o. male who presents with URI    Chief Complaint   Patient presents with   • URI     Wheezing, SOB, possible bronchitis, x 1 week.     PFSH reviewed and updated as necessary in EPIC electronic record with patient today.  Medications including OTC medications reviewed with patient.         Allergies   Allergen Reactions   • Advair [Fluticasone-Salmeterol]      Headache   • Benadryl [Altaryl]      Per neurologist   • Codeine      Constipation   • Pcn [Penicillins] Rash     Per patient   • Prozac [Fluoxetine]      Excessive sedation   • Shellfish Allergy Anaphylaxis   • Simvastatin      myalgia-per pt.   • Zyprexa      Excessive sedation             URI   This is a new problem. The current episode started in the past 7 days. The problem has been gradually worsening. There has been no fever. Associated symptoms include congestion, coughing and wheezing. Pertinent negatives include no abdominal pain, chest pain, diarrhea, dysuria, ear pain, headaches, joint pain, joint swelling, nausea, neck pain, plugged ear sensation, rash, rhinorrhea, sinus pain, sneezing, sore throat, swollen glands or vomiting. He has tried inhaler use (all medications. Using Atrovent for rescue inhaler. Stopped taking Symbicort 1 week ago due to 'rash' on forehead. Does not like budesonide because of servere headache that the medications gives him. Not using albuterol. ) for the symptoms. The treatment provided mild relief.       Review of Systems   Constitutional: Negative for fever and chills.   HENT: Positive for congestion. Negative for ear pain, rhinorrhea, sneezing and sore throat.    Respiratory: Positive for cough and wheezing. Negative for shortness of breath.    Cardiovascular: Negative for chest pain.   Gastrointestinal: Negative for nausea, vomiting, abdominal pain and diarrhea.   Genitourinary: Negative for dysuria.   Musculoskeletal: Negative for joint pain and neck pain.   Skin:  "Negative for rash.   Neurological: Negative for dizziness and headaches.   Endo/Heme/Allergies: Negative for environmental allergies.   Psychiatric/Behavioral: Negative for substance abuse.          Objective:     /74 mmHg  Pulse 54  Temp(Src) 36.6 °C (97.9 °F)  Resp 14  Ht 1.765 m (5' 9.5\")  Wt 111.585 kg (246 lb)  BMI 35.82 kg/m2  SpO2 95%     Physical Exam   Constitutional: He is oriented to person, place, and time. He appears well-developed and well-nourished.   HENT:   Head: Normocephalic.   Right Ear: Hearing, tympanic membrane, external ear and ear canal normal.   Left Ear: Hearing, tympanic membrane, external ear and ear canal normal.   Nose: Mucosal edema present.   Mouth/Throat: Uvula is midline and oropharynx is clear and moist. No uvula swelling. No oropharyngeal exudate.   Eyes: Conjunctivae and lids are normal. Pupils are equal, round, and reactive to light.   Neck: Trachea normal, normal range of motion, full passive range of motion without pain and phonation normal. Neck supple.   Cardiovascular: Normal rate, regular rhythm, normal heart sounds and normal pulses.    No murmur heard.  Pulmonary/Chest: Effort normal. No respiratory distress. He has no decreased breath sounds. He has no wheezes. He has rhonchi. He has no rales. He exhibits no tenderness.   Abdominal: Soft.   Musculoskeletal: Normal range of motion.   Lymphadenopathy:     He has no cervical adenopathy.   Neurological: He is alert and oriented to person, place, and time. No sensory deficit. Gait normal. GCS eye subscore is 4. GCS verbal subscore is 5. GCS motor subscore is 6.   Skin: Skin is warm, dry and intact.   Psychiatric: He has a normal mood and affect. His speech is normal and behavior is normal. Judgment and thought content normal.   Nursing note and vitals reviewed.              Assessment/Plan:     1. Acute bronchitis, unspecified organism  albuterol 108 (90 BASE) MCG/ACT Aero Soln inhalation aerosol    " levofloxacin (LEVAQUIN) 500 MG tablet    MethylPREDNISolone (MEDROL DOSEPAK) 4 MG Tablet Therapy Pack   2. COPD exacerbation (CMS-[facility])  MethylPREDNISolone (MEDROL DOSEPAK) 4 MG Tablet Therapy Pack     Educated in proper administration of medication(s) ordered today including safety, possible SE, risks, benefits, rationale and alternatives to therapy.   Return to clinic or PCP prn if current symptoms are not resolving in a satisfactory manner or sooner if new or worsening symptoms occur.   Patient and or family advised differential diagnoses, signs and symptoms which would warrant Emergency Department evaluation.  Verbalizes understanding of instructions.   Pt education done. Aftercare instructions given to pt/ caregiver. Questions answered. Verbalizes good understanding.     Keep scheduled appointment with pulmonology on April 4. Take all medications and inhalers with him to his appointment to go over with his provider. Aspirate most of what to take and when to take it. He does seem to have some confusion about his medications/inhalers, and inconsistent use.

## 2017-03-27 RX ORDER — FUROSEMIDE 40 MG/1
TABLET ORAL
Qty: 30 TAB | Refills: 3 | Status: SHIPPED | OUTPATIENT
Start: 2017-03-27

## 2017-04-04 ENCOUNTER — OFFICE VISIT (OUTPATIENT)
Dept: PULMONOLOGY | Facility: HOSPICE | Age: 72
End: 2017-04-04
Payer: MEDICARE

## 2017-04-04 VITALS
TEMPERATURE: 97.9 F | SYSTOLIC BLOOD PRESSURE: 130 MMHG | OXYGEN SATURATION: 92 % | RESPIRATION RATE: 16 BRPM | WEIGHT: 244 LBS | DIASTOLIC BLOOD PRESSURE: 72 MMHG | HEART RATE: 58 BPM | BODY MASS INDEX: 36.14 KG/M2 | HEIGHT: 69 IN

## 2017-04-04 DIAGNOSIS — G47.33 OSA ON CPAP: ICD-10-CM

## 2017-04-04 DIAGNOSIS — J44.89 COPD (CHRONIC OBSTRUCTIVE PULMONARY DISEASE) WITH CHRONIC BRONCHITIS: ICD-10-CM

## 2017-04-04 PROCEDURE — G8598 ASA/ANTIPLAT THER USED: HCPCS | Performed by: NURSE PRACTITIONER

## 2017-04-04 PROCEDURE — 1036F TOBACCO NON-USER: CPT | Performed by: NURSE PRACTITIONER

## 2017-04-04 PROCEDURE — 1101F PT FALLS ASSESS-DOCD LE1/YR: CPT | Performed by: NURSE PRACTITIONER

## 2017-04-04 PROCEDURE — G8419 CALC BMI OUT NRM PARAM NOF/U: HCPCS | Performed by: NURSE PRACTITIONER

## 2017-04-04 PROCEDURE — G8432 DEP SCR NOT DOC, RNG: HCPCS | Performed by: NURSE PRACTITIONER

## 2017-04-04 PROCEDURE — 99213 OFFICE O/P EST LOW 20 MIN: CPT | Performed by: NURSE PRACTITIONER

## 2017-04-04 PROCEDURE — 4040F PNEUMOC VAC/ADMIN/RCVD: CPT | Performed by: NURSE PRACTITIONER

## 2017-04-04 PROCEDURE — 3017F COLORECTAL CA SCREEN DOC REV: CPT | Performed by: NURSE PRACTITIONER

## 2017-04-04 RX ORDER — HYDROCODONE BITARTRATE AND ACETAMINOPHEN 5; 325 MG/1; MG/1
TABLET ORAL
Refills: 0 | COMMUNITY
Start: 2017-01-05 | End: 2017-05-22

## 2017-04-04 RX ORDER — ATORVASTATIN CALCIUM 40 MG/1
40 TABLET, FILM COATED ORAL
COMMUNITY
End: 2017-05-22

## 2017-04-04 RX ORDER — HYDROCODONE BITARTRATE AND ACETAMINOPHEN 10; 325 MG/1; MG/1
TABLET ORAL
COMMUNITY
End: 2017-05-22

## 2017-04-04 RX ORDER — CLINDAMYCIN HYDROCHLORIDE 300 MG/1
CAPSULE ORAL
Refills: 0 | COMMUNITY
Start: 2017-02-03 | End: 2017-05-22

## 2017-04-04 RX ORDER — OMEPRAZOLE 40 MG/1
40 CAPSULE, DELAYED RELEASE ORAL
COMMUNITY
End: 2017-05-22

## 2017-04-04 RX ORDER — GENTAMICIN SULFATE 40 MG/ML
INJECTION, SOLUTION INTRAMUSCULAR; INTRAVENOUS
COMMUNITY
Start: 2008-03-25 | End: 2017-05-22

## 2017-04-04 RX ORDER — DOXYCYCLINE HYCLATE 20 MG
TABLET ORAL
COMMUNITY
Start: 2017-01-18 | End: 2017-05-22

## 2017-04-04 RX ORDER — LOSARTAN POTASSIUM 50 MG/1
50 TABLET ORAL
COMMUNITY
End: 2017-05-22

## 2017-04-04 RX ORDER — CEPHALEXIN 500 MG/1
CAPSULE ORAL
Refills: 0 | COMMUNITY
Start: 2017-01-05 | End: 2017-05-22

## 2017-04-04 RX ORDER — BUDESONIDE 0.25 MG/2ML
250 INHALANT ORAL 2 TIMES DAILY
COMMUNITY
End: 2017-05-22

## 2017-04-04 RX ORDER — DILTIAZEM HCL 90 MG
90 TABLET ORAL
COMMUNITY
End: 2017-05-22

## 2017-04-04 RX ORDER — ALLOPURINOL 300 MG/1
300 TABLET ORAL
COMMUNITY
End: 2017-05-22

## 2017-04-04 RX ORDER — WARFARIN SODIUM 4 MG/1
4 TABLET ORAL
COMMUNITY
End: 2017-05-22

## 2017-04-04 NOTE — PROGRESS NOTES
Chief Complaint   Patient presents with   • Follow-Up     Recent URI         HPI:  This is a 71 y.o. male with a history of chronic obstructive pulmonary disease.  Pulmonary function tests from 4/8/16 indicates FEV1 1.92 L, 61% predicted, FEV1/FVC 65%, TLC 84% predicted, and DLCO 104% predicted. Currently the patient is using Atrovent approximately 3-4 times per week. He has discontinued Symbicort, pro-air, and budesonide nebulized treatments due to the causing headache or memory lapse. It seems as though he is fairly intolerant to albuterol so will start him on Spiriva Respimat 2 inhalations daily.    Polysomnogram indicates AHI 70.4 and minimum saturation 82%. The patient is compliant with APAP 10-18 cm with 2 L of oxygen bleed in. Patient reports using his she nightly for 7 or more hours each night. He has no problems tolerating the mask or pressure. He is well rested when he wakes up.    Past Medical History   Diagnosis Date   • Dyslipidemia    • Gout    • Atrial fibrillation (CMS-HCC)    • HTN (hypertension)    • S/P aortic valve replacement    • Indigestion    • Breath shortness    • Snoring    • Arthritis      gout, osteo hands, feet knees   • Bronchitis 12/2014   • Pneumonia 2/2015   • Asthma      uses inhalers   • FREDDY on CPAP      cpap   • Heart murmur    • AS (aortic stenosis) of bioprosthetic valve and needs TAVR 6/14/2015   • Hypertension    • GERD (gastroesophageal reflux disease)    • CHF (congestive heart failure) (CMS-HCC)    • Allergy    • COPD (chronic obstructive pulmonary disease) with chronic bronchitis (CMS-HCC) 4/15/2016       Past Surgical History   Procedure Laterality Date   • Aortic valve replacement  2004, 2015     bovine valve   • Septoplasty     • Colonoscopy  1/3/11     colonic ileo cecal valve prominent fold-prominent lymphoid tissue   • Ethmoidectomy  4/7/2015     Performed by Gopal Guzmán M.D. at SURGERY SAME DAY [facility]   • Antrostomy  4/7/2015     Performed by Gopal Guzmán M.D.  "at SURGERY SAME DAY [facility]   • Kyphoplasty  6/15       Social History   Substance Use Topics   • Smoking status: Former Smoker -- 1.00 packs/day for 15 years     Types: Cigarettes     Quit date: 01/01/1984   • Smokeless tobacco: Never Used      Comment: quit in 1984   • Alcohol Use: 1.2 oz/week     1 Glasses of wine, 1 Cans of beer per week      Comment: rarely       ROS:   Constitutional: Denies fevers, chills, sweats, fatigue, and weight loss.  Eyes: Glasses.  Ears/nose/mouth/throat: Denies injury.  Cardiovascular: Denies chest pain, tightness.  Respiratory: See history of present illness.  GI: Denies heartburn, difficulty swallowing, nausea, and vomiting.  Neurological: Denies frequent headaches, dizziness, weakness.    Vitals:  Filed Vitals:    04/04/17 0800   Height: 1.765 m (5' 9.49\")   Weight: 110.678 kg (244 lb)   Weight % change since last entry.: 0 %   BP: 130/72   Pulse: 58   BMI (Calculated): 35.53   Resp: 16   Temp: 36.6 °C (97.9 °F)   O2 sat % room air: 92 %       Allergies:  Advair; Benadryl; Codeine; Prozac; Shellfish allergy; Simvastatin; Zyprexa; and Pcn    Medications:  Current Outpatient Prescriptions   Medication Sig Dispense Refill   • gentamicin (GARAMYCIN) 40 MG/ML Solution by Intramuscular route.     • dronedarone (MULTAQ) 400 MG Tab Take  by mouth.     • doxycycline (PERIOSTAT) 20 MG tablet      • cephALEXin (KEFLEX) 500 MG Cap   0   • budesonide (PULMICORT) 0.25 MG/2ML Suspension 250 mcg by Nebulization route 2 times a day.     • Aromatic Inhalants (VICKS VAPOR INHALER INH) Inhale  by mouth.     • Tiotropium Bromide Monohydrate (SPIRIVA RESPIMAT) 2.5 MCG/ACT Aero Soln Inhale 2 Inhalation by mouth every day. 1 Inhaler 5   • albuterol 108 (90 BASE) MCG/ACT Aero Soln inhalation aerosol Inhale 2 Puffs by mouth every 6 hours as needed for Shortness of Breath. 8.5 g 0   • atorvastatin (LIPITOR) 40 MG Tab TAKE 1 TABLET BY MOUTH ONCE DAILY IN THE EVENING. 90 Tab 1   • MethylPREDNISolone " (MEDROL DOSEPAK) 4 MG Tablet Therapy Pack Take 1 Tab by mouth every day. 1 Kit 0   • losartan (COZAAR) 100 MG Tab TAKE ONE TABLET BY MOUTH ONE TIME DAILY 90 Tab 1   • warfarin (COUMADIN) 4 MG Tab TAKE ONE TABLET BY MOUTH ONE TIME DAILY 90 Tab 1   • hydrocodone-acetaminophen (NORCO) 7.5-325 MG per tablet Take 1 Tab by mouth every 8 hours as needed. 60 Tab 0   • budesonide-formoterol (SYMBICORT) 80-4.5 MCG/ACT Aerosol Inhale 2 Puffs by mouth 2 Times a Day. Inhale 2 puffs by mouth twice daily with spacer. Rinse mouth after each use. 1 Inhaler 5   • ipratropium (ATROVENT HFA) 17 MCG/ACT Aero Soln Inhale 2 Puffs by mouth every 6 hours as needed (for shortness of breath and wheezing.). 1 Inhaler 5   • allopurinol (ZYLOPRIM) 300 MG Tab Take 0.5 Tabs by mouth every day. 45 Tab 1   • omeprazole (PRILOSEC) 20 MG delayed-release capsule Take 1 Cap by mouth every day. 90 Cap 1   • diltiazem (CARDIZEM) 90 MG Tab Take 1 Tab by mouth 2 Times a Day. 180 Tab 1   • carvedilol (COREG) 25 MG Tab Take 1 Tab by mouth 2 times a day, with meals. 180 Tab 3   • potassium chloride SA (K-DUR) 20 MEQ Tab CR Take 1 Tab by mouth 2 times a day. 60 Tab 11   • hydrocodone-acetaminophen (NORCO) 5-325 MG Tab per tablet   0   • hydrocodone/acetaminophen (NORCO)  MG Tab Take  by mouth.     • clindamycin (CLEOCIN) 300 MG Cap   0   • allopurinol (ZYLOPRIM) 300 MG Tab Take 300 mg by mouth.     • atorvastatin (LIPITOR) 40 MG Tab Take 40 mg by mouth.     • diltiazem (CARDIZEM) 90 MG Tab Take 90 mg by mouth.     • losartan (COZAAR) 50 MG Tab Take 50 mg by mouth.     • omeprazole (PRILOSEC) 40 MG delayed-release capsule Take 40 mg by mouth.     • warfarin (COUMADIN) 4 MG Tab Take 4 mg by mouth.     • furosemide (LASIX) 40 MG Tab TAKE ONE TABLET BY MOUTH ONE TIME DAILY 30 Tab 3   • levofloxacin (LEVAQUIN) 500 MG tablet Take 1 Tab by mouth every day. 5 Tab 0   • MethylPREDNISolone (MEDROL DOSEPAK) 4 MG Tablet Therapy Pack Take 1 Tab by mouth every day. 1  Kit 0   • albuterol 108 (90 BASE) MCG/ACT Aero Soln inhalation aerosol Inhale 2 Puffs by mouth every 6 hours as needed for Shortness of Breath. 8.5 g 5   • ipratropium-albuterol (DUONEB) 0.5-2.5 (3) MG/3ML nebulizer solution 3 mL by Nebulization route every four hours as needed for Shortness of Breath (Wheezing). 120 Vial 5     No current facility-administered medications for this visit.       PHYSICAL EXAM:  Appearance: Well-developed, well-nourished, no acute distress.  Eyes. PERRL.  Hearing: Grossly intact.  Oropharynx: Tongue normal, posterior pharynx without erythema or exudate.  Respiratory effort: No intercostal retractions or use of accessory muscles.  Lung auscultation: No crackles, wheezing.  Heart auscultation: No murmur, gallop, or rub. Regular rate and rhythm.  Extremities: No cyanosis or edema.  Gait and Station: Normal  Orientation: Oriented to time, place, and person.    Assessment:  1. COPD (chronic obstructive pulmonary disease) with chronic bronchitis (CMS-[facility])  Tiotropium Bromide Monohydrate (SPIRIVA RESPIMAT) 2.5 MCG/ACT Aero Soln   2. FREDDY on CPAP           Plan:  1. [place] Spiriva Respimat 2 inhalations daily.  2. Continue Atrovent 2 puffs every 4 hours as needed for shortness of breath and wheezing.  3. Continue APAP 10-18 cm with 2 L of oxygen bleed in.  4. Clean equipment weekly replacement supplies as allowed by insurance.     Return in about 3 months (around 7/4/2017) for With JEANNETTE Neff.

## 2017-04-04 NOTE — PATIENT INSTRUCTIONS
1. [place] Spiriva Respimat 2 inhalations daily.  2. Continue Atrovent 2 puffs every 4 hours as needed for shortness of breath and wheezing.  3. Continue APAP 10-18 cm with 2 L of oxygen bleed in.  4. Clean equipment weekly replacement supplies as allowed by insurance.

## 2017-04-04 NOTE — MR AVS SNAPSHOT
"        Hal Nava   2017 8:20 AM   Office Visit   MRN: [id number]    Department:  [facility]   Dept Phone:  [phone]    Description:  Male : 1945   Provider:  DEXTER Oconnor           Reason for Visit     Follow-Up Recent URI      Allergies as of 2017     Allergen Noted Reactions    Advair [Fluticasone-Salmeterol] 2014       Headache    Benadryl [Altaryl] 2015       Per neurologist    Codeine 2014       Constipation    Prozac [Fluoxetine] 2014       Excessive sedation    Shellfish Allergy 2015   Anaphylaxis    Simvastatin 10/19/2016       myalgia-per pt.    Zyprexa 2014       Excessive sedation    Pcn [Penicillins] 2014   Rash, Hives    2003;rash  Per patient      You were diagnosed with     COPD (chronic obstructive pulmonary disease) with chronic bronchitis (CMS-[facility])   [153533]       FREDDY on CPAP   [571553]         Vital Signs     Blood Pressure Pulse Temperature Respirations Height Weight    130/72 mmHg 58 36.6 °C (97.9 °F) 16 1.765 m (5' 9.49\") 110.678 kg (244 lb)    Body Mass Index Oxygen Saturation Smoking Status             35.53 kg/m2 92% Former Smoker         Basic Information     Date Of Birth Sex Race Ethnicity Preferred Language    1945 Male White Non- English      Your appointments     2017  9:20 AM   Established Patient with Sandy Sahni M.D.   [facility] (--)    [address]  [address]  [place] [address] [phone]           You will be receiving a confirmation call a few days before your appointment from our automated call confirmation system.            2017 10:00 AM   Established Patient Pul with DEXTER Oconnor   [facility] (--)    [address]  [address]  [place] [address] [phone]              Problem List              ICD-10-CM Priority Class Noted - Resolved    Dyslipidemia E78.5 Medium  Unknown - Present   " Gout M10.9 Low  Unknown - Present    Atrial fibrillation (CMS-HCC) I48.91 Medium  Unknown - Present    S/P aortic valve replacement Z95.2 Medium  Unknown - Present    GERD (gastroesophageal reflux disease) K21.9 Low  9/11/2014 - Present    FREDDY on CPAP G47.33 Medium  Unknown - Present    Chronic anticoagulation Z79.01 Low  1/13/2015 - Present    Chronic maxillary sinusitis J32.0 Low  4/7/2015 - Present    CAD (coronary artery disease) I25.10 Medium  4/8/2015 - Present    Hx of Hyperglycemia R73.9 Low  4/9/2015 - Present    Anemia D64.9 Low  4/9/2015 - Present    Chronic combined systolic and diastolic heart failure (CMS-HCC) I50.42 Medium  4/16/2015 - Present    AS (aortic stenosis) of bioprosthetic valve and needs TAVR I35.0 Medium  6/14/2015 - Present    Essential hypertension I10 Medium  6/23/2015 - Present    COPD (chronic obstructive pulmonary disease) with chronic bronchitis (CMS-HCC) J44.9 Low  4/15/2016 - Present      Health Maintenance        Date Due Completion Dates    IMM DTaP/Tdap/Td Vaccine (1 - Tdap) 9/1/1964 ---    COLONOSCOPY 1/3/2021 1/3/2011 (Done)    Override on 1/3/2011: Done            Current Immunizations     13-VALENT PCV PREVNAR 4/15/2016  8:53 AM    Influenza Vaccine Adult HD 10/26/2016    Influenza Vaccine Quad Inj (Preserved) 10/20/2015, 1/15/2015    Pneumococcal polysaccharide vaccine (PPSV-23) 4/11/2015  2:51 PM    SHINGLES VACCINE 10/3/2011      Below and/or attached are the medications your provider expects you to take. Review all of your home medications and newly ordered medications with your provider and/or pharmacist. Follow medication instructions as directed by your provider and/or pharmacist. Please keep your medication list with you and share with your provider. Update the information when medications are discontinued, doses are changed, or new medications (including over-the-counter products) are added; and carry medication information at all times in the event of emergency  situations     Allergies:  ADVAIR - (reactions not documented)     BENADRYL - (reactions not documented)     CODEINE - (reactions not documented)     PROZAC - (reactions not documented)     SHELLFISH ALLERGY - Anaphylaxis     SIMVASTATIN - (reactions not documented)     ZYPREXA - (reactions not documented)     PCN - Rash,Hives               Medications  Valid as of: April 04, 2017 -  8:36 AM    Generic Name Brand Name Tablet Size Instructions for use    Albuterol Sulfate (Aero Soln) albuterol 108 (90 BASE) MCG/ACT Inhale 2 Puffs by mouth every 6 hours as needed for Shortness of Breath.        Albuterol Sulfate (Aero Soln) albuterol 108 (90 BASE) MCG/ACT Inhale 2 Puffs by mouth every 6 hours as needed for Shortness of Breath.        Allopurinol (Tab) ZYLOPRIM 300 MG Take 0.5 Tabs by mouth every day.        Allopurinol (Tab) ZYLOPRIM 300 MG Take 300 mg by mouth.        Aromatic Inhalants   Inhale  by mouth.        Atorvastatin Calcium (Tab) LIPITOR 40 MG TAKE 1 TABLET BY MOUTH ONCE DAILY IN THE EVENING.        Atorvastatin Calcium (Tab) LIPITOR 40 MG Take 40 mg by mouth.        Budesonide (Suspension) PULMICORT 0.25 MG/2ML 250 mcg by Nebulization route 2 times a day.        Budesonide-Formoterol Fumarate (Aerosol) SYMBICORT 80-4.5 MCG/ACT Inhale 2 Puffs by mouth 2 Times a Day. Inhale 2 puffs by mouth twice daily with spacer. Rinse mouth after each use.        Carvedilol (Tab) COREG 25 MG Take 1 Tab by mouth 2 times a day, with meals.        Cephalexin (Cap) KEFLEX 500 MG         Clindamycin HCl (Cap) CLEOCIN 300 MG         DiltiaZEM HCl (Tab) CARDIZEM 90 MG Take 1 Tab by mouth 2 Times a Day.        DiltiaZEM HCl (Tab) CARDIZEM 90 MG Take 90 mg by mouth.        Doxycycline Hyclate (Tab) PERIOSTAT 20 MG         Dronedarone HCl (Tab) MULTAQ 400 MG Take  by mouth.        Furosemide (Tab) LASIX 40 MG TAKE ONE TABLET BY MOUTH ONE TIME DAILY        Gentamicin Sulfate (Solution) GARAMYCIN 40 MG/ML by Intramuscular route.          Hydrocodone-Acetaminophen (Tab) NORCO 7.5-325 MG Take 1 Tab by mouth every 8 hours as needed.        Hydrocodone-Acetaminophen (Tab) NORCO 5-325 MG         Hydrocodone-Acetaminophen (Tab) NORCO  MG Take  by mouth.        Ipratropium Bromide HFA (Aero Soln) ATROVENT 17 MCG/ACT Inhale 2 Puffs by mouth every 6 hours as needed (for shortness of breath and wheezing.).        Ipratropium-Albuterol (Solution) DUONEB 0.5-2.5 (3) MG/3ML 3 mL by Nebulization route every four hours as needed for Shortness of Breath (Wheezing).        LevoFLOXacin (Tab) LEVAQUIN 500 MG Take 1 Tab by mouth every day.        Losartan Potassium (Tab) COZAAR 100 MG TAKE ONE TABLET BY MOUTH ONE TIME DAILY        Losartan Potassium (Tab) COZAAR 50 MG Take 50 mg by mouth.        MethylPREDNISolone (Tablet Therapy Pack) MEDROL DOSEPAK 4 MG Take 1 Tab by mouth every day.        MethylPREDNISolone (Tablet Therapy Pack) MEDROL DOSEPAK 4 MG Take 1 Tab by mouth every day.        Omeprazole (CAPSULE DELAYED RELEASE) PRILOSEC 20 MG Take 1 Cap by mouth every day.        Omeprazole (CAPSULE DELAYED RELEASE) PRILOSEC 40 MG Take 40 mg by mouth.        Potassium Chloride Sarahy CR (Tab CR) Kdur 20 MEQ Take 1 Tab by mouth 2 times a day.        Tiotropium Bromide Monohydrate (Aero Soln) Tiotropium Bromide Monohydrate 2.5 MCG/ACT Inhale 2 Inhalation by mouth every day.        Warfarin Sodium (Tab) COUMADIN 4 MG TAKE ONE TABLET BY MOUTH ONE TIME DAILY        Warfarin Sodium (Tab) COUMADIN 4 MG Take 4 mg by mouth.        .                 Medicines prescribed today were sent to:     [facility] - [place], [place] - [address]    [address] [place] [address]    Phone: [phone] Fax: [phone]    Open 24 Hours?: No      Medication refill instructions:       If your prescription bottle indicates you have medication refills left, it is not necessary to call your provider’s office. Please contact your pharmacy and they will refill your medication.     If your prescription bottle indicates you do not have any refills left, you may request refills at any time through one of the following ways: The online [facility] system (except Urgent Care), by calling your provider’s office, or by asking your pharmacy to contact your provider’s office with a refill request. Medication refills are processed only during regular business hours and may not be available until the next business day. Your provider may request additional information or to have a follow-up visit with you prior to refilling your medication.   *Please Note: Medication refills are assigned a new Rx number when refilled electronically. Your pharmacy may indicate that no refills were authorized even though a new prescription for the same medication is available at the pharmacy. Please request the medicine by name with the pharmacy before contacting your provider for a refill.        Instructions    1. [place] Spiriva Respimat 2 inhalations daily.  2. Continue Atrovent 2 puffs every 4 hours as needed for shortness of breath and wheezing.  3. Continue APAP 10-18 cm with 2 L of oxygen bleed in.  4. Clean equipment weekly replacement supplies as allowed by insurance.               [facility] Access Code: Activation code not generated  Current [facility] Status: Active

## 2017-04-05 ENCOUNTER — HOSPITAL ENCOUNTER (OUTPATIENT)
Dept: LAB | Facility: MEDICAL CENTER | Age: 72
End: 2017-04-05
Attending: FAMILY MEDICINE
Payer: MEDICARE

## 2017-04-05 DIAGNOSIS — I48.0 PAROXYSMAL ATRIAL FIBRILLATION (HCC): ICD-10-CM

## 2017-04-05 LAB
INR PPP: 3.24 (ref 0.87–1.13)
PROTHROMBIN TIME: 34.1 SEC (ref 12–14.6)

## 2017-04-05 PROCEDURE — 85610 PROTHROMBIN TIME: CPT

## 2017-04-05 PROCEDURE — 36415 COLL VENOUS BLD VENIPUNCTURE: CPT

## 2017-04-06 ENCOUNTER — OFFICE VISIT (OUTPATIENT)
Dept: MEDICAL GROUP | Facility: PHYSICIAN GROUP | Age: 72
End: 2017-04-06
Payer: MEDICARE

## 2017-04-06 VITALS
OXYGEN SATURATION: 91 % | TEMPERATURE: 97.5 F | WEIGHT: 249.6 LBS | RESPIRATION RATE: 20 BRPM | BODY MASS INDEX: 36.97 KG/M2 | HEART RATE: 60 BPM | SYSTOLIC BLOOD PRESSURE: 122 MMHG | HEIGHT: 69 IN | DIASTOLIC BLOOD PRESSURE: 80 MMHG

## 2017-04-06 DIAGNOSIS — E66.9 OBESITY (BMI 30-39.9): ICD-10-CM

## 2017-04-06 DIAGNOSIS — R73.01 IMPAIRED FASTING BLOOD SUGAR: ICD-10-CM

## 2017-04-06 DIAGNOSIS — Z12.5 SCREENING FOR PROSTATE CANCER: ICD-10-CM

## 2017-04-06 DIAGNOSIS — I10 ESSENTIAL HYPERTENSION: ICD-10-CM

## 2017-04-06 DIAGNOSIS — G47.33 OSA ON CPAP: ICD-10-CM

## 2017-04-06 DIAGNOSIS — E78.5 DYSLIPIDEMIA: ICD-10-CM

## 2017-04-06 DIAGNOSIS — I50.42 CHRONIC COMBINED SYSTOLIC AND DIASTOLIC HEART FAILURE (HCC): ICD-10-CM

## 2017-04-06 DIAGNOSIS — J44.9 CHRONIC OBSTRUCTIVE PULMONARY DISEASE, UNSPECIFIED COPD TYPE (HCC): ICD-10-CM

## 2017-04-06 DIAGNOSIS — I48.0 PAF (PAROXYSMAL ATRIAL FIBRILLATION) (HCC): ICD-10-CM

## 2017-04-06 PROCEDURE — 4040F PNEUMOC VAC/ADMIN/RCVD: CPT | Performed by: FAMILY MEDICINE

## 2017-04-06 PROCEDURE — G8598 ASA/ANTIPLAT THER USED: HCPCS | Performed by: FAMILY MEDICINE

## 2017-04-06 PROCEDURE — 1036F TOBACCO NON-USER: CPT | Performed by: FAMILY MEDICINE

## 2017-04-06 PROCEDURE — 3017F COLORECTAL CA SCREEN DOC REV: CPT | Performed by: FAMILY MEDICINE

## 2017-04-06 PROCEDURE — G8419 CALC BMI OUT NRM PARAM NOF/U: HCPCS | Performed by: FAMILY MEDICINE

## 2017-04-06 PROCEDURE — 99214 OFFICE O/P EST MOD 30 MIN: CPT | Performed by: FAMILY MEDICINE

## 2017-04-06 PROCEDURE — G8432 DEP SCR NOT DOC, RNG: HCPCS | Performed by: FAMILY MEDICINE

## 2017-04-06 PROCEDURE — 1101F PT FALLS ASSESS-DOCD LE1/YR: CPT | Performed by: FAMILY MEDICINE

## 2017-04-06 NOTE — MR AVS SNAPSHOT
"        Hal Nava   2017 9:20 AM   Office Visit   MRN: [id number]    Department:  [facility]   Dept Phone:  [phone]    Description:  Male : 1945   Provider:  Sandy Sahni M.D.           Reason for Visit     Follow-Up 4 months      Allergies as of 2017     Allergen Noted Reactions    Advair [Fluticasone-Salmeterol] 2014       Headache    Benadryl [Altaryl] 2015       Per neurologist    Codeine 2014       Constipation    Prozac [Fluoxetine] 2014       Excessive sedation    Shellfish Allergy 2015   Anaphylaxis    Simvastatin 10/19/2016       myalgia-per pt.    Symbicort [Budesonide-Formoterol Fumarate] 2017       Zyprexa 2014       Excessive sedation    Pcn [Penicillins] 2014   Rash, Hives    2003;rash  Per patient      You were diagnosed with     Chronic obstructive pulmonary disease, unspecified COPD type (CMS-[facility])   [2367192]       FREDDY on CPAP   [715521]       Chronic combined systolic and diastolic heart failure (CMS-[facility])   [428.42.ICD-9-CM]       PAF (paroxysmal atrial fibrillation) (CMS-[facility])   [114341]       Essential hypertension   [1788998]       Dyslipidemia   [756980]       Impaired fasting blood sugar   [571474]       Screening for prostate cancer   [662533]         Vital Signs     Blood Pressure Pulse Temperature Respirations Height Weight    122/80 mmHg 60 36.4 °C (97.5 °F) 20 1.753 m (5' 9\") 113.218 kg (249 lb 9.6 oz)    Body Mass Index Oxygen Saturation Smoking Status             36.84 kg/m2 91% Former Smoker         Basic Information     Date Of Birth Sex Race Ethnicity Preferred Language    1945 Male White Non- English      Your appointments     2017 10:00 AM   Established Patient Pul with DEXTER Oconnor   [facility] (--)    [address]  [facility] [address]  [place] [address]   [phone]            Aug 10, 2017  9:00 AM   Established Patient with Sandy Sahni M.D.  "   [facility] (--)    [address]  [address]  [place] [id number] [phone]           You will be receiving a confirmation call a few days before your appointment from our automated call confirmation system.              Problem List              ICD-10-CM Priority Class Noted - Resolved    Dyslipidemia E78.5 Medium  Unknown - Present    Gout M10.9 Low  Unknown - Present    Atrial fibrillation (CMS-HCC) I48.91 Medium  Unknown - Present    S/P aortic valve replacement Z95.2 Medium  Unknown - Present    GERD (gastroesophageal reflux disease) K21.9 Low  9/11/2014 - Present    FREDDY on CPAP G47.33 Medium  Unknown - Present    Chronic anticoagulation Z79.01 Low  1/13/2015 - Present    Chronic maxillary sinusitis J32.0 Low  4/7/2015 - Present    CAD (coronary artery disease) I25.10 Medium  4/8/2015 - Present    Hx of Hyperglycemia R73.9 Low  4/9/2015 - Present    Anemia D64.9 Low  4/9/2015 - Present    Chronic combined systolic and diastolic heart failure (CMS-HCC) I50.42 Medium  4/16/2015 - Present    AS (aortic stenosis) of bioprosthetic valve and needs TAVR I35.0 Medium  6/14/2015 - Present    Essential hypertension I10 Medium  6/23/2015 - Present    COPD (chronic obstructive pulmonary disease) with chronic bronchitis (CMS-HCC) J44.9 Low  4/15/2016 - Present      Health Maintenance        Date Due Completion Dates    IMM DTaP/Tdap/Td Vaccine (1 - Tdap) 9/1/1964 ---    COLONOSCOPY 1/3/2021 1/3/2011 (Done)    Override on 1/3/2011: Done            Current Immunizations     13-VALENT PCV PREVNAR 4/15/2016  8:53 AM    Influenza Vaccine Adult HD 10/26/2016    Influenza Vaccine Quad Inj (Preserved) 10/20/2015, 1/15/2015    Pneumococcal polysaccharide vaccine (PPSV-23) 4/11/2015  2:51 PM    SHINGLES VACCINE 10/3/2011      Below and/or attached are the medications your provider expects you to take. Review all of your home medications and newly ordered medications with your provider and/or pharmacist. Follow  medication instructions as directed by your provider and/or pharmacist. Please keep your medication list with you and share with your provider. Update the information when medications are discontinued, doses are changed, or new medications (including over-the-counter products) are added; and carry medication information at all times in the event of emergency situations     Allergies:  ADVAIR - (reactions not documented)     BENADRYL - (reactions not documented)     CODEINE - (reactions not documented)     PROZAC - (reactions not documented)     SHELLFISH ALLERGY - Anaphylaxis     SIMVASTATIN - (reactions not documented)     SYMBICORT - (reactions not documented)     ZYPREXA - (reactions not documented)     PCN - Rash,Hives               Medications  Valid as of: April 06, 2017 -  9:50 AM    Generic Name Brand Name Tablet Size Instructions for use    Albuterol Sulfate (Aero Soln) albuterol 108 (90 BASE) MCG/ACT Inhale 2 Puffs by mouth every 6 hours as needed for Shortness of Breath.        Albuterol Sulfate (Aero Soln) albuterol 108 (90 BASE) MCG/ACT Inhale 2 Puffs by mouth every 6 hours as needed for Shortness of Breath.        Allopurinol (Tab) ZYLOPRIM 300 MG Take 0.5 Tabs by mouth every day.        Allopurinol (Tab) ZYLOPRIM 300 MG Take 300 mg by mouth.        Aromatic Inhalants   Inhale  by mouth.        Atorvastatin Calcium (Tab) LIPITOR 40 MG TAKE 1 TABLET BY MOUTH ONCE DAILY IN THE EVENING.        Atorvastatin Calcium (Tab) LIPITOR 40 MG Take 40 mg by mouth.        Budesonide (Suspension) PULMICORT 0.25 MG/2ML 250 mcg by Nebulization route 2 times a day.        Budesonide-Formoterol Fumarate (Aerosol) SYMBICORT 80-4.5 MCG/ACT Inhale 2 Puffs by mouth 2 Times a Day. Inhale 2 puffs by mouth twice daily with spacer. Rinse mouth after each use.        Carvedilol (Tab) COREG 25 MG Take 1 Tab by mouth 2 times a day, with meals.        Cephalexin (Cap) KEFLEX 500 MG         Clindamycin HCl (Cap) CLEOCIN 300 MG            DiltiaZEM HCl (Tab) CARDIZEM 90 MG Take 1 Tab by mouth 2 Times a Day.        DiltiaZEM HCl (Tab) CARDIZEM 90 MG Take 90 mg by mouth.        Doxycycline Hyclate (Tab) PERIOSTAT 20 MG         Dronedarone HCl (Tab) MULTAQ 400 MG Take  by mouth.        Furosemide (Tab) LASIX 40 MG TAKE ONE TABLET BY MOUTH ONE TIME DAILY        Gentamicin Sulfate (Solution) GARAMYCIN 40 MG/ML by Intramuscular route.        Hydrocodone-Acetaminophen (Tab) NORCO 7.5-325 MG Take 1 Tab by mouth every 8 hours as needed.        Hydrocodone-Acetaminophen (Tab) NORCO 5-325 MG         Hydrocodone-Acetaminophen (Tab) NORCO  MG Take  by mouth.        Ipratropium Bromide HFA (Aero Soln) ATROVENT 17 MCG/ACT Inhale 2 Puffs by mouth every 6 hours as needed (for shortness of breath and wheezing.).        Ipratropium-Albuterol (Solution) DUONEB 0.5-2.5 (3) MG/3ML 3 mL by Nebulization route every four hours as needed for Shortness of Breath (Wheezing).        LevoFLOXacin (Tab) LEVAQUIN 500 MG Take 1 Tab by mouth every day.        Losartan Potassium (Tab) COZAAR 100 MG TAKE ONE TABLET BY MOUTH ONE TIME DAILY        Losartan Potassium (Tab) COZAAR 50 MG Take 50 mg by mouth.        MethylPREDNISolone (Tablet Therapy Pack) MEDROL DOSEPAK 4 MG Take 1 Tab by mouth every day.        MethylPREDNISolone (Tablet Therapy Pack) MEDROL DOSEPAK 4 MG Take 1 Tab by mouth every day.        Omeprazole (CAPSULE DELAYED RELEASE) PRILOSEC 20 MG Take 1 Cap by mouth every day.        Omeprazole (CAPSULE DELAYED RELEASE) PRILOSEC 40 MG Take 40 mg by mouth.        Potassium Chloride Sarahy CR (Tab CR) Kdur 20 MEQ Take 1 Tab by mouth 2 times a day.        Tiotropium Bromide Monohydrate (Aero Soln) Tiotropium Bromide Monohydrate 2.5 MCG/ACT Inhale 2 Inhalation by mouth every day.        Warfarin Sodium (Tab) COUMADIN 4 MG TAKE ONE TABLET BY MOUTH ONE TIME DAILY        Warfarin Sodium (Tab) COUMADIN 4 MG Take 4 mg by mouth.        .                 Medicines prescribed today  were sent to:     [facility] [place], [place] - [address]    [address] [place] [address]    Phone: [phone] Fax: [phone]    Open 24 Hours?: No      Medication refill instructions:       If your prescription bottle indicates you have medication refills left, it is not necessary to call your provider’s office. Please contact your pharmacy and they will refill your medication.    If your prescription bottle indicates you do not have any refills left, you may request refills at any time through one of the following ways: The online [employer] system (except Urgent Care), by calling your provider’s office, or by asking your pharmacy to contact your provider’s office with a refill request. Medication refills are processed only during regular business hours and may not be available until the next business day. Your provider may request additional information or to have a follow-up visit with you prior to refilling your medication.   *Please Note: Medication refills are assigned a new Rx number when refilled electronically. Your pharmacy may indicate that no refills were authorized even though a new prescription for the same medication is available at the pharmacy. Please request the medicine by name with the pharmacy before contacting your provider for a refill.        Your To Do List     Future Labs/Procedures Complete By Expires    CBC WITH DIFFERENTIAL  As directed 4/7/2018    COMP METABOLIC PANEL  As directed 4/7/2018    HEMOGLOBIN A1C  As directed 4/7/2018    LIPID PROFILE  As directed 4/7/2018    PROSTATE SPECIFIC AG SCREENING  As directed 4/7/2018         [employer] Access Code: Activation code not generated  Current [employer] Status: Active

## 2017-04-07 PROBLEM — E66.9 OBESITY (BMI 30-39.9): Status: ACTIVE | Noted: 2017-04-07

## 2017-04-07 NOTE — PROGRESS NOTES
"Subjective:      Hal Nava is a 71 y.o. male who presents with Follow-Up            HPI     Patient returns for follow-up of his medical problems. He is accompanied by his wife.    In terms of his COPD, he was recently seen in urgent care on 3/25/17 and was treated for acute bronchitis and COPD exacerbation with Levaquin for 5 days and Medrol Dosepak. He said he is already finished with antibiotics but he is still taking the prednisone. He is feeling much better. He continues follow-up with the pulmonologist and he had appointment 2 days ago. Patient developed problems with Symbicort, albuterol and budesonide with headache and memory lapses. He was switched to Spiriva respimat and Atrovent nebulization.    He continues use his CPAP machine for FREDDY with good results.    In terms of his history of heart failure, he denies any problems with lower extremity edema. He continues to take furosemide, carvedilol, Cardizem.    For his paroxysmal atrial fibrillation, he continues to take long-term anticoagulation with Coumadin. He did his INR yesterday. He denies any palpitations or chest pain. He follows up with a cardiologist regularly. Rate is controlled on Cardizem.    Blood pressure is under control on his medications including carvedilol, Cardizem, furosemide.    In terms of his dyslipidemia, he continues to take atorvastatin without any myalgias.    We follow him for impaired fasting blood sugar. He tries to manage this by watching his diet.    Past medical history, past surgical history, family history reviewed-no changes    Social history reviewed-no changes    Allergies reviewed-no changes    Medications reviewed-no changes    ROS     Review of systems as per history of present illness, the rest are negative.     Objective:     /80 mmHg  Pulse 60  Temp(Src) 36.4 °C (97.5 °F)  Resp 20  Ht 1.753 m (5' 9\")  Wt 113.218 kg (249 lb 9.6 oz)  BMI 36.84 kg/m2  SpO2 91%     Physical Exam     Examined " alert, awake, oriented, not in distress    Neck-supple, no lymphadenopathy, no thyromegaly  Lungs-clear to auscultation, no rales, no wheezes  Heart-regular rate and rhythm, no A. fib appreciated today, no murmur  Extremities-no edema, clubbing, cyanosis     Hospital Outpatient Visit on 04/05/2017   Component Date Value   • PT 04/05/2017 34.1*   • INR 04/05/2017 3.24*   Hospital Outpatient Visit on 03/14/2017   Component Date Value   • PT 03/14/2017 22.7*   • INR 03/14/2017 1.93*        Assessment/Plan:     1. Chronic obstructive pulmonary disease, unspecified COPD type (CMS-HCC)  He had recent acute bronchitis and COPD exacerbation seen and evaluated at urgent care and was put on Levaquin and Medrol Dosepak. He is finished with the Levaquin and he is finishing the Medrol Dosepak. He is definitively improved and back to his baseline. He was switched to the above inhalers because of some problems with his previous inhalers. Continue follow-up with pulmonologist.  - CBC WITH DIFFERENTIAL; Future  - COMP METABOLIC PANEL; Future  - LIPID PROFILE; Future  - HEMOGLOBIN A1C; Future    2. FREDDY on CPAP  Stable on CPAP. Continue follow-up with pulmonologist.  - CBC WITH DIFFERENTIAL; Future  - COMP METABOLIC PANEL; Future  - LIPID PROFILE; Future  - HEMOGLOBIN A1C; Future    3. Chronic combined systolic and diastolic heart failure (CMS-HCC)  Currently compensated. Continue medications. Continue follow-up with cardiologist.  - CBC WITH DIFFERENTIAL; Future  - COMP METABOLIC PANEL; Future  - LIPID PROFILE; Future  - HEMOGLOBIN A1C; Future    4. PAF (paroxysmal atrial fibrillation) (CMS-HCC)  Is currently in sinus rhythm. His INR is slightly elevated but this could be because of recent antibiotic use as well as by mouth prednisone. We will not change the dose of his Coumadin. He will do another INR in a week and we will communicate results.  - CBC WITH DIFFERENTIAL; Future  - COMP METABOLIC PANEL; Future  - LIPID PROFILE;  Future  - HEMOGLOBIN A1C; Future    5. Essential hypertension  Controlled on his medications.  - CBC WITH DIFFERENTIAL; Future  - COMP METABOLIC PANEL; Future  - LIPID PROFILE; Future  - HEMOGLOBIN A1C; Future    6. Dyslipidemia  The last lab work came back in December 2016 on target except for low HDL in the 30s. He will continue atorvastatin and recheck blood work next visit.  - CBC WITH DIFFERENTIAL; Future  - COMP METABOLIC PANEL; Future  - LIPID PROFILE; Future  - HEMOGLOBIN A1C; Future    7. Impaired fasting blood sugar  He will continue to manage this by watching his diet. Recheck blood work next visit.  - CBC WITH DIFFERENTIAL; Future  - COMP METABOLIC PANEL; Future  - LIPID PROFILE; Future  - HEMOGLOBIN A1C; Future    8. Screening for prostate cancer  We will add screening PSA with the next blood work.  - CBC WITH DIFFERENTIAL; Future  - COMP METABOLIC PANEL; Future  - LIPID PROFILE; Future  - HEMOGLOBIN A1C; Future  - PROSTATE SPECIFIC AG SCREENING; Future    9. Obesity (BMI 30-39.9)  Discussed diet, exercise and weight loss.  - Patient identified as having weight management issue.  Appropriate orders and counseling given.      Please note that this dictation was created using voice recognition software. I have worked with consultants from the vendor as well as technical experts from [employer] to optimize the interface. I have made every reasonable attempt to correct obvious errors, but I expect that there are errors of grammar and possibly content I did not discover before finalizing the note.

## 2017-05-09 ENCOUNTER — HOSPITAL ENCOUNTER (OUTPATIENT)
Dept: LAB | Facility: MEDICAL CENTER | Age: 72
End: 2017-05-09
Attending: FAMILY MEDICINE
Payer: MEDICARE

## 2017-05-09 DIAGNOSIS — I48.0 PAROXYSMAL ATRIAL FIBRILLATION (HCC): ICD-10-CM

## 2017-05-09 LAB
INR PPP: 2.34 (ref 0.87–1.13)
PROTHROMBIN TIME: 26.4 SEC (ref 12–14.6)

## 2017-05-09 PROCEDURE — 36415 COLL VENOUS BLD VENIPUNCTURE: CPT

## 2017-05-09 PROCEDURE — 85610 PROTHROMBIN TIME: CPT

## 2017-05-10 ENCOUNTER — TELEPHONE (OUTPATIENT)
Dept: MEDICAL GROUP | Facility: PHYSICIAN GROUP | Age: 72
End: 2017-05-10

## 2017-05-10 NOTE — TELEPHONE ENCOUNTER
----- Message from Sandy Sahni M.D. sent at 5/9/2017  7:52 PM PDT -----  INR is back to normal range at 2.34. Continue same dose of Coumadin and recheck INR in a month.

## 2017-05-22 ENCOUNTER — HOSPITAL ENCOUNTER (OUTPATIENT)
Facility: MEDICAL CENTER | Age: 72
End: 2017-05-23
Attending: EMERGENCY MEDICINE | Admitting: HOSPITALIST
Payer: MEDICARE

## 2017-05-22 ENCOUNTER — RESOLUTE PROFESSIONAL BILLING HOSPITAL PROF FEE (OUTPATIENT)
Dept: HOSPITALIST | Facility: MEDICAL CENTER | Age: 72
End: 2017-05-22
Payer: MEDICARE

## 2017-05-22 ENCOUNTER — APPOINTMENT (OUTPATIENT)
Dept: RADIOLOGY | Facility: MEDICAL CENTER | Age: 72
End: 2017-05-22
Attending: EMERGENCY MEDICINE
Payer: MEDICARE

## 2017-05-22 DIAGNOSIS — J44.1 COPD WITH EXACERBATION (HCC): ICD-10-CM

## 2017-05-22 DIAGNOSIS — J44.89 COPD (CHRONIC OBSTRUCTIVE PULMONARY DISEASE) WITH CHRONIC BRONCHITIS: Chronic | ICD-10-CM

## 2017-05-22 DIAGNOSIS — J44.1 ACUTE EXACERBATION OF CHRONIC OBSTRUCTIVE PULMONARY DISEASE (COPD) (HCC): ICD-10-CM

## 2017-05-22 DIAGNOSIS — I50.9 ACUTE ON CHRONIC CONGESTIVE HEART FAILURE, UNSPECIFIED CONGESTIVE HEART FAILURE TYPE: ICD-10-CM

## 2017-05-22 DIAGNOSIS — R07.9 CHEST PAIN, UNSPECIFIED TYPE: ICD-10-CM

## 2017-05-22 LAB
ALBUMIN SERPL BCP-MCNC: 3.7 G/DL (ref 3.2–4.9)
ALBUMIN/GLOB SERPL: 1.2 G/DL
ALP SERPL-CCNC: 88 U/L (ref 30–99)
ALT SERPL-CCNC: 12 U/L (ref 2–50)
ANION GAP SERPL CALC-SCNC: 9 MMOL/L (ref 0–11.9)
APTT PPP: 43.4 SEC (ref 24.7–36)
AST SERPL-CCNC: 19 U/L (ref 12–45)
BASOPHILS # BLD AUTO: 0.2 % (ref 0–1.8)
BASOPHILS # BLD: 0.02 K/UL (ref 0–0.12)
BILIRUB SERPL-MCNC: 0.6 MG/DL (ref 0.1–1.5)
BNP SERPL-MCNC: 31 PG/ML (ref 0–100)
BUN SERPL-MCNC: 18 MG/DL (ref 8–22)
CALCIUM SERPL-MCNC: 8.8 MG/DL (ref 8.5–10.5)
CHLORIDE SERPL-SCNC: 106 MMOL/L (ref 96–112)
CO2 SERPL-SCNC: 24 MMOL/L (ref 20–33)
CREAT SERPL-MCNC: 0.9 MG/DL (ref 0.5–1.4)
EKG IMPRESSION: NORMAL
EOSINOPHIL # BLD AUTO: 0.92 K/UL (ref 0–0.51)
EOSINOPHIL NFR BLD: 9.5 % (ref 0–6.9)
ERYTHROCYTE [DISTWIDTH] IN BLOOD BY AUTOMATED COUNT: 48.1 FL (ref 35.9–50)
ETHANOL BLD-MCNC: 0 G/DL
GFR SERPL CREATININE-BSD FRML MDRD: >60 ML/MIN/1.73 M 2
GLOBULIN SER CALC-MCNC: 3.1 G/DL (ref 1.9–3.5)
GLUCOSE SERPL-MCNC: 147 MG/DL (ref 65–99)
HCT VFR BLD AUTO: 43.5 % (ref 42–52)
HGB BLD-MCNC: 14.1 G/DL (ref 14–18)
IMM GRANULOCYTES # BLD AUTO: 0.03 K/UL (ref 0–0.11)
IMM GRANULOCYTES NFR BLD AUTO: 0.3 % (ref 0–0.9)
INR PPP: 2.59 (ref 0.87–1.13)
LYMPHOCYTES # BLD AUTO: 1.5 K/UL (ref 1–4.8)
LYMPHOCYTES NFR BLD: 15.4 % (ref 22–41)
MCH RBC QN AUTO: 28.7 PG (ref 27–33)
MCHC RBC AUTO-ENTMCNC: 32.4 G/DL (ref 33.7–35.3)
MCV RBC AUTO: 88.4 FL (ref 81.4–97.8)
MONOCYTES # BLD AUTO: 0.61 K/UL (ref 0–0.85)
MONOCYTES NFR BLD AUTO: 6.3 % (ref 0–13.4)
NEUTROPHILS # BLD AUTO: 6.63 K/UL (ref 1.82–7.42)
NEUTROPHILS NFR BLD: 68.3 % (ref 44–72)
NRBC # BLD AUTO: 0 K/UL
NRBC BLD AUTO-RTO: 0 /100 WBC
PLATELET # BLD AUTO: 184 K/UL (ref 164–446)
PMV BLD AUTO: 9.9 FL (ref 9–12.9)
POTASSIUM SERPL-SCNC: 4.1 MMOL/L (ref 3.6–5.5)
PROT SERPL-MCNC: 6.8 G/DL (ref 6–8.2)
PROTHROMBIN TIME: 28.6 SEC (ref 12–14.6)
RBC # BLD AUTO: 4.92 M/UL (ref 4.7–6.1)
SODIUM SERPL-SCNC: 139 MMOL/L (ref 135–145)
TROPONIN I SERPL-MCNC: 0.04 NG/ML (ref 0–0.04)
WBC # BLD AUTO: 9.7 K/UL (ref 4.8–10.8)

## 2017-05-22 PROCEDURE — 99285 EMERGENCY DEPT VISIT HI MDM: CPT

## 2017-05-22 PROCEDURE — 80307 DRUG TEST PRSMV CHEM ANLYZR: CPT

## 2017-05-22 PROCEDURE — 71010 DX-CHEST-PORTABLE (1 VIEW): CPT

## 2017-05-22 PROCEDURE — 700102 HCHG RX REV CODE 250 W/ 637 OVERRIDE(OP): Performed by: HOSPITALIST

## 2017-05-22 PROCEDURE — 93005 ELECTROCARDIOGRAM TRACING: CPT | Performed by: EMERGENCY MEDICINE

## 2017-05-22 PROCEDURE — A9270 NON-COVERED ITEM OR SERVICE: HCPCS | Performed by: HOSPITALIST

## 2017-05-22 PROCEDURE — 304562 HCHG STAT O2 MASK/CANNULA

## 2017-05-22 PROCEDURE — 700105 HCHG RX REV CODE 258: Performed by: EMERGENCY MEDICINE

## 2017-05-22 PROCEDURE — 99220 PR INITIAL OBSERVATION CARE,LEVL III: CPT | Performed by: HOSPITALIST

## 2017-05-22 PROCEDURE — 304561 HCHG STAT O2

## 2017-05-22 PROCEDURE — 80053 COMPREHEN METABOLIC PANEL: CPT

## 2017-05-22 PROCEDURE — 700102 HCHG RX REV CODE 250 W/ 637 OVERRIDE(OP): Performed by: EMERGENCY MEDICINE

## 2017-05-22 PROCEDURE — 85610 PROTHROMBIN TIME: CPT

## 2017-05-22 PROCEDURE — 84484 ASSAY OF TROPONIN QUANT: CPT

## 2017-05-22 PROCEDURE — 85025 COMPLETE CBC W/AUTO DIFF WBC: CPT

## 2017-05-22 PROCEDURE — G0378 HOSPITAL OBSERVATION PER HR: HCPCS

## 2017-05-22 PROCEDURE — 96374 THER/PROPH/DIAG INJ IV PUSH: CPT

## 2017-05-22 PROCEDURE — 96375 TX/PRO/DX INJ NEW DRUG ADDON: CPT

## 2017-05-22 PROCEDURE — 85730 THROMBOPLASTIN TIME PARTIAL: CPT

## 2017-05-22 PROCEDURE — 700111 HCHG RX REV CODE 636 W/ 250 OVERRIDE (IP): Performed by: EMERGENCY MEDICINE

## 2017-05-22 PROCEDURE — 83880 ASSAY OF NATRIURETIC PEPTIDE: CPT

## 2017-05-22 PROCEDURE — A9270 NON-COVERED ITEM OR SERVICE: HCPCS | Performed by: EMERGENCY MEDICINE

## 2017-05-22 RX ORDER — ATORVASTATIN CALCIUM 40 MG/1
40 TABLET, FILM COATED ORAL
Status: DISCONTINUED | OUTPATIENT
Start: 2017-05-22 | End: 2017-05-23 | Stop reason: HOSPADM

## 2017-05-22 RX ORDER — POLYETHYLENE GLYCOL 3350 17 G/17G
1 POWDER, FOR SOLUTION ORAL
Status: DISCONTINUED | OUTPATIENT
Start: 2017-05-22 | End: 2017-05-23 | Stop reason: HOSPADM

## 2017-05-22 RX ORDER — GUAIFENESIN/DEXTROMETHORPHAN 100-10MG/5
10 SYRUP ORAL EVERY 6 HOURS PRN
Status: DISCONTINUED | OUTPATIENT
Start: 2017-05-22 | End: 2017-05-23 | Stop reason: HOSPADM

## 2017-05-22 RX ORDER — WARFARIN SODIUM 4 MG/1
4 TABLET ORAL EVERY EVENING
COMMUNITY
End: 2017-07-21 | Stop reason: SDUPTHER

## 2017-05-22 RX ORDER — OMEPRAZOLE 20 MG/1
20 CAPSULE, DELAYED RELEASE ORAL DAILY
Status: DISCONTINUED | OUTPATIENT
Start: 2017-05-23 | End: 2017-05-23 | Stop reason: HOSPADM

## 2017-05-22 RX ORDER — METHYLPREDNISOLONE SODIUM SUCCINATE 125 MG/2ML
125 INJECTION, POWDER, LYOPHILIZED, FOR SOLUTION INTRAMUSCULAR; INTRAVENOUS ONCE
Status: COMPLETED | OUTPATIENT
Start: 2017-05-22 | End: 2017-05-22

## 2017-05-22 RX ORDER — POTASSIUM CHLORIDE 20 MEQ/1
20 TABLET, EXTENDED RELEASE ORAL 2 TIMES DAILY
Status: DISCONTINUED | OUTPATIENT
Start: 2017-05-22 | End: 2017-05-23 | Stop reason: HOSPADM

## 2017-05-22 RX ORDER — ACETAMINOPHEN 325 MG/1
650 TABLET ORAL EVERY 6 HOURS PRN
Status: DISCONTINUED | OUTPATIENT
Start: 2017-05-22 | End: 2017-05-23 | Stop reason: HOSPADM

## 2017-05-22 RX ORDER — LOSARTAN POTASSIUM 50 MG/1
100 TABLET ORAL DAILY
Status: DISCONTINUED | OUTPATIENT
Start: 2017-05-23 | End: 2017-05-23 | Stop reason: HOSPADM

## 2017-05-22 RX ORDER — METHYLPREDNISOLONE SODIUM SUCCINATE 40 MG/ML
40 INJECTION, POWDER, LYOPHILIZED, FOR SOLUTION INTRAMUSCULAR; INTRAVENOUS EVERY 6 HOURS
Status: DISCONTINUED | OUTPATIENT
Start: 2017-05-23 | End: 2017-05-23

## 2017-05-22 RX ORDER — AMOXICILLIN 250 MG
2 CAPSULE ORAL 2 TIMES DAILY
Status: DISCONTINUED | OUTPATIENT
Start: 2017-05-22 | End: 2017-05-23 | Stop reason: HOSPADM

## 2017-05-22 RX ORDER — ALLOPURINOL 100 MG/1
150 TABLET ORAL DAILY
Status: DISCONTINUED | OUTPATIENT
Start: 2017-05-23 | End: 2017-05-23 | Stop reason: HOSPADM

## 2017-05-22 RX ORDER — ONDANSETRON 2 MG/ML
4 INJECTION INTRAMUSCULAR; INTRAVENOUS EVERY 4 HOURS PRN
Status: DISCONTINUED | OUTPATIENT
Start: 2017-05-22 | End: 2017-05-22

## 2017-05-22 RX ORDER — SODIUM CHLORIDE 9 MG/ML
INJECTION, SOLUTION INTRAVENOUS CONTINUOUS
Status: DISCONTINUED | OUTPATIENT
Start: 2017-05-22 | End: 2017-05-23 | Stop reason: HOSPADM

## 2017-05-22 RX ORDER — BISACODYL 10 MG
10 SUPPOSITORY, RECTAL RECTAL
Status: DISCONTINUED | OUTPATIENT
Start: 2017-05-22 | End: 2017-05-23 | Stop reason: HOSPADM

## 2017-05-22 RX ORDER — WARFARIN SODIUM 2 MG/1
2 TABLET ORAL
Status: COMPLETED | OUTPATIENT
Start: 2017-05-22 | End: 2017-05-23

## 2017-05-22 RX ORDER — ONDANSETRON 4 MG/1
4 TABLET, ORALLY DISINTEGRATING ORAL EVERY 4 HOURS PRN
Status: DISCONTINUED | OUTPATIENT
Start: 2017-05-22 | End: 2017-05-22

## 2017-05-22 RX ORDER — CARVEDILOL 6.25 MG/1
25 TABLET ORAL 2 TIMES DAILY WITH MEALS
Status: DISCONTINUED | OUTPATIENT
Start: 2017-05-23 | End: 2017-05-23 | Stop reason: HOSPADM

## 2017-05-22 RX ORDER — FUROSEMIDE 10 MG/ML
40 INJECTION INTRAMUSCULAR; INTRAVENOUS ONCE
Status: COMPLETED | OUTPATIENT
Start: 2017-05-22 | End: 2017-05-23

## 2017-05-22 RX ORDER — BUDESONIDE AND FORMOTEROL FUMARATE DIHYDRATE 80; 4.5 UG/1; UG/1
2 AEROSOL RESPIRATORY (INHALATION) 2 TIMES DAILY
Status: DISCONTINUED | OUTPATIENT
Start: 2017-05-22 | End: 2017-05-23 | Stop reason: HOSPADM

## 2017-05-22 RX ORDER — FUROSEMIDE 40 MG/1
40 TABLET ORAL
Status: DISCONTINUED | OUTPATIENT
Start: 2017-05-22 | End: 2017-05-23 | Stop reason: HOSPADM

## 2017-05-22 RX ORDER — DILTIAZEM HCL 90 MG
90 TABLET ORAL TWICE DAILY
Status: DISCONTINUED | OUTPATIENT
Start: 2017-05-22 | End: 2017-05-23 | Stop reason: HOSPADM

## 2017-05-22 RX ADMIN — ASPIRIN 325 MG: 325 TABLET, DELAYED RELEASE ORAL at 21:33

## 2017-05-22 RX ADMIN — METHYLPREDNISOLONE SODIUM SUCCINATE 125 MG: 125 INJECTION, POWDER, FOR SOLUTION INTRAMUSCULAR; INTRAVENOUS at 20:14

## 2017-05-22 RX ADMIN — SODIUM CHLORIDE: 9 INJECTION, SOLUTION INTRAVENOUS at 20:14

## 2017-05-22 ASSESSMENT — PAIN SCALES - GENERAL: PAINLEVEL_OUTOF10: 0

## 2017-05-22 NOTE — IP AVS SNAPSHOT
" Home Care Instructions                                                                                                                  Name:Hal Nava  Medical Record Number:[id number]  CSN: [id number]    YOB: 1945   Age: 71 y.o.  Sex: male  HT:1.803 m (5' 11\") WT: 112.9 kg (248 lb 14.4 oz)          Admit Date: 5/22/2017     Discharge Date:   Today's Date: 5/23/2017  Attending Doctor:  Gustavo Higgins M.D.                  Allergies:  Albuterol; Codeine; Diphenhydramine; Fluoxetine; Fluticasone-salmeterol; Olanzapine; Shellfish allergy; Simvastatin; Zyprexa; and Pcn            Discharge Instructions       Discharge Instructions    Discharged to home by car with relative. Discharged via wheelchair, hospital escort: Yes.  Special equipment needed: Not Applicable    Be sure to schedule a follow-up appointment with your primary care doctor or any specialists as instructed.     Discharge Plan:   Influenza Vaccine Indication: Not indicated: Previously immunized this influenza season and > 8 years of age    I understand that a diet low in cholesterol, fat, and sodium is recommended for good health. Unless I have been given specific instructions below for another diet, I accept this instruction as my diet prescription.   Other diet: Heart Healthy    Special Instructions: None    · Is patient discharged on Warfarin / Coumadin?   No     · Is patient Post Blood Transfusion?  No    Depression / Suicide Risk    As you are discharged from [facility], it is important to learn how to keep safe from harming yourself.    Recognize the warning signs:  · Abrupt changes in personality, positive or negative- including increase in energy   · Giving away possessions  · Change in eating patterns- significant weight changes-  positive or negative  · Change in sleeping patterns- unable to sleep or sleeping all the time   · Unwillingness or inability to communicate  · Depression  · Unusual sadness, " discouragement and loneliness  · Talk of wanting to die  · Neglect of personal appearance   · Rebelliousness- reckless behavior  · Withdrawal from people/activities they love  · Confusion- inability to concentrate     If you or a loved one observes any of these behaviors or has concerns about self-harm, here's what you can do:  · Talk about it- your feelings and reasons for harming yourself  · Remove any means that you might use to hurt yourself (examples: pills, rope, extension cords, firearm)  · Get professional help from the community (Mental Health, Substance Abuse, psychological counseling)  · Do not be alone:Call your Safe Contact- someone whom you trust who will be there for you.  · Call your local CRISIS HOTLINE 300-9190 or 073-414-8795  · Call your local Children's Mobile Crisis Response Team [place] (110) 992-4393 or www.[employer].com  · Call the toll free National Suicide Prevention Hotlines   · National Suicide Prevention Lifeline 123-621-GMWN (8630)  · National Hope Line Network 800-SUICIDE (158-5151)      Heart Failure  Heart failure is a condition in which the heart has trouble pumping blood. This means your heart does not pump blood efficiently for your body to work well. In some cases of heart failure, fluid may back up into your lungs or you may have swelling (edema) in your lower legs. Heart failure is usually a long-term (chronic) condition. It is important for you to take good care of yourself and follow your health care provider's treatment plan.  CAUSES   Some health conditions can cause heart failure. Those health conditions include:  · High blood pressure (hypertension). Hypertension causes the heart muscle to work harder than normal. When pressure in the blood vessels is high, the heart needs to pump (contract) with more force in order to circulate blood throughout the body. High blood pressure eventually causes the heart to become stiff and weak.  · Coronary artery disease (CAD).  CAD is the buildup of cholesterol and fat (plaque) in the arteries of the heart. The blockage in the arteries deprives the heart muscle of oxygen and blood. This can cause chest pain and may lead to a heart attack. High blood pressure can also contribute to CAD.  · Heart attack (myocardial infarction). A heart attack occurs when one or more arteries in the heart become blocked. The loss of oxygen damages the muscle tissue of the heart. When this happens, part of the heart muscle dies. The injured tissue does not contract as well and weakens the heart's ability to pump blood.  · Abnormal heart valves. When the heart valves do not open and close properly, it can cause heart failure. This makes the heart muscle pump harder to keep the blood flowing.  · Heart muscle disease (cardiomyopathy or myocarditis). Heart muscle disease is damage to the heart muscle from a variety of causes. These can include drug or alcohol abuse, infections, or unknown reasons. These can increase the risk of heart failure.  · Lung disease. Lung disease makes the heart work harder because the lungs do not work properly. This can cause a strain on the heart, leading it to fail.  · Diabetes. Diabetes increases the risk of heart failure. High blood sugar contributes to high fat (lipid) levels in the blood. Diabetes can also cause slow damage to tiny blood vessels that carry important nutrients to the heart muscle. When the heart does not get enough oxygen and food, it can cause the heart to become weak and stiff. This leads to a heart that does not contract efficiently.  · Other conditions can contribute to heart failure. These include abnormal heart rhythms, thyroid problems, and low blood counts (anemia).  Certain unhealthy behaviors can increase the risk of heart failure, including:  · Being overweight.  · Smoking or chewing tobacco.  · Eating foods high in fat and cholesterol.  · Abusing illicit drugs or alcohol.  · Lacking physical  activity.  SYMPTOMS   Heart failure symptoms may vary and can be hard to detect. Symptoms may include:  · Shortness of breath with activity, such as climbing stairs.  · Persistent cough.  · Swelling of the feet, ankles, legs, or abdomen.  · Unexplained weight gain.  · Difficulty breathing when lying flat (orthopnea).  · Waking from sleep because of the need to sit up and get more air.  · Rapid heartbeat.  · Fatigue and loss of energy.  · Feeling light-headed, dizzy, or close to fainting.  · Loss of appetite.  · Nausea.  · Increased urination during the night (nocturia).  DIAGNOSIS   A diagnosis of heart failure is based on your history, symptoms, physical examination, and diagnostic tests. Diagnostic tests for heart failure may include:  · Echocardiography.  · Electrocardiography.  · Chest X-ray.  · Blood tests.  · Exercise stress test.  · Cardiac angiography.  · Radionuclide scans.  TREATMENT   Treatment is aimed at managing the symptoms of heart failure. Medicines, behavioral changes, or surgical intervention may be necessary to treat heart failure.  · Medicines to help treat heart failure may include:  ¨ Angiotensin-converting enzyme (ACE) inhibitors. This type of medicine blocks the effects of a blood protein called angiotensin-converting enzyme. ACE inhibitors relax (dilate) the blood vessels and help lower blood pressure.  ¨ Angiotensin receptor blockers (ARBs). This type of medicine blocks the actions of a blood protein called angiotensin. Angiotensin receptor blockers dilate the blood vessels and help lower blood pressure.  ¨ Water pills (diuretics). Diuretics cause the kidneys to remove salt and water from the blood. The extra fluid is removed through urination. This loss of extra fluid lowers the volume of blood the heart pumps.  ¨ Beta blockers. These prevent the heart from beating too fast and improve heart muscle strength.  ¨ Digitalis. This increases the force of the heartbeat.  · Healthy behavior  changes include:  ¨ Obtaining and maintaining a healthy weight.  ¨ Stopping smoking or chewing tobacco.  ¨ Eating heart-healthy foods.  ¨ Limiting or avoiding alcohol.  ¨ Stopping illicit drug use.  ¨ Physical activity as directed by your health care provider.  · Surgical treatment for heart failure may include:  ¨ A procedure to open blocked arteries, repair damaged heart valves, or remove damaged heart muscle tissue.  ¨ A pacemaker to improve heart muscle function and control certain abnormal heart rhythms.  ¨ An internal cardioverter defibrillator to treat certain serious abnormal heart rhythms.  ¨ A left ventricular assist device (LVAD) to assist the pumping ability of the heart.  HOME CARE INSTRUCTIONS   · Take medicines only as directed by your health care provider. Medicines are important in reducing the workload of your heart, slowing the progression of heart failure, and improving your symptoms.  ¨ Do not stop taking your medicine unless directed by your health care provider.  ¨ Do not skip any dose of medicine.  ¨ Refill your prescriptions before you run out of medicine. Your medicines are needed every day.  · Engage in moderate physical activity if directed by your health care provider. Moderate physical activity can benefit some people. The elderly and people with severe heart failure should consult with a health care provider for physical activity recommendations.  · Eat heart-healthy foods. Food choices should be free of trans fat and low in saturated fat, cholesterol, and salt (sodium). Healthy choices include fresh or frozen fruits and vegetables, fish, lean meats, legumes, fat-free or low-fat dairy products, and whole grain or high fiber foods. Talk to a dietitian to learn more about heart-healthy foods.  · Limit sodium if directed by your health care provider. Sodium restriction may reduce symptoms of heart failure in some people. Talk to a dietitian to learn more about heart-healthy  seasonings.  · Use healthy cooking methods. Healthy cooking methods include roasting, grilling, broiling, baking, poaching, steaming, or stir-frying. Talk to a dietitian to learn more about healthy cooking methods.  · Limit fluids if directed by your health care provider. Fluid restriction may reduce symptoms of heart failure in some people.  · Weigh yourself every day. Daily weights are important in the early recognition of excess fluid. You should weigh yourself every morning after you urinate and before you eat breakfast. Wear the same amount of clothing each time you weigh yourself. Record your daily weight. Provide your health care provider with your weight record.  · Monitor and record your blood pressure if directed by your health care provider.  · Check your pulse if directed by your health care provider.  · Lose weight if directed by your health care provider. Weight loss may reduce symptoms of heart failure in some people.  · Stop smoking or chewing tobacco. Nicotine makes your heart work harder by causing your blood vessels to constrict. Do not use nicotine gum or patches before talking to your health care provider.  · Keep all follow-up visits as directed by your health care provider. This is important.  · Limit alcohol intake to no more than 1 drink per day for nonpregnant women and 2 drinks per day for men. One drink equals 12 ounces of beer, 5 ounces of wine, or 1½ ounces of hard liquor. Drinking more than that is harmful to your heart. Tell your health care provider if you drink alcohol several times a week. Talk with your health care provider about whether alcohol is safe for you. If your heart has already been damaged by alcohol or you have severe heart failure, drinking alcohol should be stopped completely.  · Stop illicit drug use.  · Stay up-to-date with immunizations. It is especially important to prevent respiratory infections through current pneumococcal and influenza immunizations.  · Manage  other health conditions such as hypertension, diabetes, thyroid disease, or abnormal heart rhythms as directed by your health care provider.  · Learn to manage stress.  · Plan rest periods when fatigued.  · Learn strategies to manage high temperatures. If the weather is extremely hot:  ¨ Avoid vigorous physical activity.  ¨ Use air conditioning or fans or seek a cooler location.  ¨ Avoid caffeine and alcohol.  ¨ Wear loose-fitting, lightweight, and light-colored clothing.  · Learn strategies to manage cold temperatures. If the weather is extremely cold:  ¨ Avoid vigorous physical activity.  ¨ Layer clothes.  ¨ Wear mittens or gloves, a hat, and a scarf when going outside.  ¨ Avoid alcohol.  · Obtain ongoing education and support as needed.  · Participate in or seek rehabilitation as needed to maintain or improve independence and quality of life.  SEEK MEDICAL CARE IF:   · You have a rapid weight gain.  · You have increasing shortness of breath that is unusual for you.  · You are unable to participate in your usual physical activities.  · You tire easily.  · You cough more than normal, especially with physical activity.  · You have any or more swelling in areas such as your hands, feet, ankles, or abdomen.  · You are unable to sleep because it is hard to breathe.  · You feel like your heart is beating fast (palpitations).  · You become dizzy or light-headed upon standing up.  SEEK IMMEDIATE MEDICAL CARE IF:   · You have difficulty breathing.  · There is a change in mental status such as decreased alertness or difficulty with concentration.  · You have a pain or discomfort in your chest.  · You have an episode of fainting (syncope).  MAKE SURE YOU:   · Understand these instructions.  · Will watch your condition.  · Will get help right away if you are not doing well or get worse.     This information is not intended to replace advice given to you by your health care provider. Make sure you discuss any questions you  have with your health care provider.     Document Released: 12/18/2006 Document Revised: 05/03/2016 Document Reviewed: 01/17/2014  [employer] Interactive Patient Education ©2016 [employer]  Chronic Obstructive Pulmonary Disease  Chronic obstructive pulmonary disease (COPD) is a common lung problem. In COPD, the flow of air from the lungs is limited. The way your lungs work will probably never return to normal, but there are things you can do to improve your lungs and make yourself feel better. Your doctor may treat your condition with:  · Medicines.  · Oxygen.  · Lung surgery.  · Changes to your diet.  · Rehabilitation. This may involve a team of specialists.  HOME CARE  · Take all medicines as told by your doctor.  · Avoid medicines or cough syrups that dry up your airway (such as antihistamines) and do not allow you to get rid of thick spit. You do not need to avoid them if told differently by your doctor.  · If you smoke, stop. Smoking makes the problem worse.  · Avoid being around things that make your breathing worse (like smoke, chemicals, and fumes).  · Use oxygen therapy and therapy to help improve your lungs (pulmonary rehabilitation) if told by your doctor. If you need home oxygen therapy, ask your doctor if you should buy a tool to measure your oxygen level (oximeter).  · Avoid people who have a sickness you can catch (contagious).  · Avoid going outside when it is very hot, cold, or humid.  · Eat healthy foods. Eat smaller meals more often. Rest before meals.  · Stay active, but remember to also rest.  · Make sure to get all the shots (vaccines) your doctor recommends. Ask your doctor if you need a pneumonia shot.  · Learn and use tips on how to relax.  · Learn and use tips on how to control your breathing as told by your doctor. Try:  · Breathing in (inhaling) through your nose for 1 second. Then, pucker your lips and breath out (exhale) through your lips for 2 seconds.  · Putting one hand on your  belly (abdomen). Breathe in slowly through your nose for 1 second. Your hand on your belly should move out. Pucker your lips and breathe out slowly through your lips. Your hand on your belly should move in as you breathe out.  · Learn and use controlled coughing to clear thick spit from your lungs. The steps are:  · Lean your head a little forward.  · Breathe in deeply.  · Try to hold your breath for 3 seconds.  · Keep your mouth slightly open while coughing 2 times.  · Spit any thick spit out into a tissue.  · Rest and do the steps again 1 or 2 times as needed.  GET HELP IF:  · You cough up more thick spit than usual.  · There is a change in the color or thickness of the spit.  · It is harder to breathe than usual.  · Your breathing is faster than usual.  GET HELP RIGHT AWAY IF:  · You have shortness of breath while resting.  · You have shortness of breath that stops you from:  ¨ Being able to talk.  ¨ Doing normal activities.  · You chest hurts for longer than 5 minutes.  · Your skin color is more blue than usual.  · Your pulse oximeter shows that you have low oxygen for longer than 5 minutes.  MAKE SURE YOU:  · Understand these instructions.  · Will watch your condition.  · Will get help right away if you are not doing well or get worse.     This information is not intended to replace advice given to you by your health care provider. Make sure you discuss any questions you have with your health care provider.     Document Released: 06/05/2009 Document Revised: 01/08/2016 Document Reviewed: 08/14/2014  [employer] Interactive Patient Education ©2016 [employer]        Your appointments     Jul 11, 2017 10:00 AM   Established Patient Pul with DEXTER Oconnor   [facility] (--)    [address]  [facility] [address]   [phone]            Aug 10, 2017  9:00 AM   Established Patient with Sandy Sahni M.D.   [facility] (--)    [address]  [address]  [place]  [place] [id number] [phone]           You will be receiving a confirmation call a few days before your appointment from our automated call confirmation system.              Follow-up Information     1. Schedule an appointment as soon as possible for a visit with Sandy Sahni M.D..    Specialty:  Family Medicine    Why:  for follow up, review oxygen needs, and get a recheck TSH/Free T4    Contact information    [address]  [place] [address] [phone]           Discharge Medication Instructions:    Below are the medications your physician expects you to take upon discharge:    Review all your home medications and newly ordered medications with your doctor and/or pharmacist. Follow medication instructions as directed by your doctor and/or pharmacist.    Please keep your medication list with you and share with your physician.               Medication List      START taking these medications        Instructions    Morning Afternoon Evening Bedtime    cefdinir 300 MG Caps   Commonly known as:  OMNICEF        Take 1 Cap by mouth 2 times a day for 4 days.   Dose:  300 mg                        doxycycline monohydrate 100 MG tablet   Commonly known as:  ADOXA        Take 1 Tab by mouth 2 times a day for 4 days.   Dose:  100 mg                        guaifenesin -10 MG/5ML Syrp syrup   Commonly known as:  ROBITUSSIN DM        Take 10 mL by mouth every 6 hours as needed for Cough.   Dose:  10 mL                        predniSONE 20 MG Tabs   Last time this was given:  40 mg on 5/23/2017  9:04 AM   Commonly known as:  DELTASONE        Take 2 Tabs by mouth every day for 4 days.   Dose:  40 mg                          CONTINUE taking these medications        Instructions    Morning Afternoon Evening Bedtime    allopurinol 300 MG Tabs   Last time this was given:  150 mg on 5/23/2017 11:01 AM   Commonly known as:  ZYLOPRIM        Take 0.5 Tabs by mouth every day.   Dose:  150 mg                         atorvastatin 40 MG Tabs   Last time this was given:  40 mg on 5/23/2017 12:17 AM   Commonly known as:  LIPITOR        TAKE 1 TABLET BY MOUTH ONCE DAILY IN THE EVENING.                        budesonide-formoterol 80-4.5 MCG/ACT Aero   Last time this was given:  2 Puffs on 5/23/2017  9:07 AM   Commonly known as:  SYMBICORT        Inhale 2 Puffs by mouth 2 Times a Day. Inhale 2 puffs by mouth twice daily with spacer. Rinse mouth after each use.   Dose:  2 Puff                        carvedilol 25 MG Tabs   Last time this was given:  25 mg on 5/23/2017  9:03 AM   Commonly known as:  COREG        Take 1 Tab by mouth 2 times a day, with meals.   Dose:  25 mg                        diltiazem 90 MG Tabs   Last time this was given:  90 mg on 5/23/2017  9:10 AM   Commonly known as:  CARDIZEM        TAKE ONE TABLET BY MOUTH TWICE DAILY                        dronedarone 400 MG Tabs   Last time this was given:  400 mg on 5/23/2017  9:09 AM   Commonly known as:  MULTAQ        Take 400 mg by mouth 2 times a day, with meals.   Dose:  400 mg                        furosemide 40 MG Tabs   Last time this was given:  40 mg on 5/23/2017  9:04 AM   Commonly known as:  LASIX        TAKE ONE TABLET BY MOUTH ONE TIME DAILY                        ipratropium 17 MCG/ACT Aers   Commonly known as:  ATROVENT HFA        Inhale 2 Puffs by mouth every 6 hours as needed (for shortness of breath and wheezing.).   Dose:  2 Puff                        losartan 100 MG Tabs   Last time this was given:  100 mg on 5/23/2017  9:05 AM   Commonly known as:  COZAAR        TAKE ONE TABLET BY MOUTH ONE TIME DAILY                        omeprazole 20 MG delayed-release capsule   Last time this was given:  20 mg on 5/23/2017  9:04 AM   Commonly known as:  PRILOSEC        TAKE ONE CAPSULE BY MOUTH ONE TIME DAILY                        potassium chloride SA 20 MEQ Tbcr   Last time this was given:  20 mEq on 5/23/2017  9:04 AM   Commonly known as:  Kdur        Take  1 Tab by mouth 2 times a day.   Dose:  20 mEq                        Tiotropium Bromide Monohydrate 2.5 MCG/ACT Aers   Commonly known as:  SPIRIVA RESPIMAT        Inhale 2 Inhalation by mouth every day.   Dose:  2 Inhalation                        warfarin 4 MG Tabs   Last time this was given:  2 mg on 5/23/2017 12:58 AM   Commonly known as:  COUMADIN        Take 4 mg by mouth every evening.   Dose:  4 mg                             Where to Get Your Medications      These medications were sent to [facility] [place] - [address]  [address] [place] [address]     Phone:  [phone]    - cefdinir 300 MG Caps  - doxycycline monohydrate 100 MG tablet  - guaifenesin -10 MG/5ML Syrp syrup  - predniSONE 20 MG Tabs            Orders for after discharge     DME O2 New Set Up    Complete by:  As directed              Instructions           Diet / Nutrition:    Follow any diet instructions given to you by your doctor or the dietician, including how much salt (sodium) you are allowed each day.    If you are overweight, talk to your doctor about a weight reduction plan.    Activity:    Remain physically active following your doctor's instructions about exercise and activity.    Rest often.     Any time you become even a little tired or short of breath, SIT DOWN and rest.    Worsening Symptoms:    Report any of the following signs and symptoms to the doctor's office immediately:    *Pain of jaw, arm, or neck  *Chest pain not relieved by medication                               *Dizziness or loss of consciousness  *Difficulty breathing even when at rest   *More tired than usual                                       *Bleeding drainage or swelling of surgical site  *Swelling of feet, ankles, legs or stomach                 *Fever (>100ºF)  *Pink or blood tinged sputum  *Weight gain (3lbs/day or 5lbs /week)           *Shock from internal defibrillator (if applicable)  *Palpitations or irregular  heartbeats                *Cool and/or numb extremities    Stroke Awareness    Common Risk Factors for Stroke include:    Age  Atrial Fibrillation  Carotid Artery Stenosis  Diabetes Mellitus  Excessive alcohol consumption  High blood pressure  Overweight   Physical inactivity  Smoking    Warning signs and symptoms of a stroke include:    *Sudden numbness or weakness of the face, arm or leg (especially on one side of the body).  *Sudden confusion, trouble speaking or understanding.  *Sudden trouble seeing in one or both eyes.  *Sudden trouble walking, dizziness, loss of balance or coordination.Sudden severe headache with no known cause.    It is very important to get treatment quickly when a stroke occurs. If you experience any of the above warning signs, call 911 immediately.                   Disclaimer         Quit Smoking / Tobacco Use:    I understand the use of any tobacco products increases my chance of suffering from future heart disease or stroke and could cause other illnesses which may shorten my life. Quitting the use of tobacco products is the single most important thing I can do to improve my health. For further information on smoking / tobacco cessation call a Toll Free Quit Line at 1-602.765.4474 (*[employer] [place]) or 1-967.486.5685 (American [employer] Association) or you can access the web based program at www.lungusa.org.    [place] Tobacco Users Help Line:  (456) 354-2306       Toll Free: 1-799.574.8519  Quit Tobacco Program [facility] (997)041-2694    Crisis Hotline:    [place] Crisis Hotline:  0-261-MARGGHQ or 1-355.711.4024    [place] Crisis Hotline:    1-602.205.4613 or 543-030-4670    Discharge Survey:   [facility] you [facility] We hope we did everything we could to make your hospital stay a pleasant one. You may be receiving a phone survey and we would appreciate your time and participation in answering the questions. Your input is very valuable to us  in our efforts to improve our service to our patients and their families.        My signature on this form indicates that:    1. I have reviewed and understand the above information.  2. My questions regarding this information have been answered to my satisfaction.  3. I have formulated a plan with my discharge nurse to obtain my prescribed medications for home.                  Disclaimer         __________________________________                     __________       ________                       Patient Signature                                                 Date                    Time

## 2017-05-23 VITALS
BODY MASS INDEX: 34.85 KG/M2 | DIASTOLIC BLOOD PRESSURE: 63 MMHG | RESPIRATION RATE: 18 BRPM | OXYGEN SATURATION: 91 % | SYSTOLIC BLOOD PRESSURE: 139 MMHG | WEIGHT: 248.9 LBS | TEMPERATURE: 97.8 F | HEART RATE: 73 BPM | HEIGHT: 71 IN

## 2017-05-23 LAB
ALBUMIN SERPL BCP-MCNC: 3.9 G/DL (ref 3.2–4.9)
ALBUMIN/GLOB SERPL: 1.2 G/DL
ALP SERPL-CCNC: 98 U/L (ref 30–99)
ALT SERPL-CCNC: 11 U/L (ref 2–50)
ANION GAP SERPL CALC-SCNC: 10 MMOL/L (ref 0–11.9)
AST SERPL-CCNC: 18 U/L (ref 12–45)
BASOPHILS # BLD AUTO: 0.1 % (ref 0–1.8)
BASOPHILS # BLD: 0.01 K/UL (ref 0–0.12)
BILIRUB SERPL-MCNC: 0.4 MG/DL (ref 0.1–1.5)
BNP SERPL-MCNC: 87 PG/ML (ref 0–100)
BUN SERPL-MCNC: 18 MG/DL (ref 8–22)
CALCIUM SERPL-MCNC: 9 MG/DL (ref 8.5–10.5)
CHLORIDE SERPL-SCNC: 104 MMOL/L (ref 96–112)
CHOLEST SERPL-MCNC: 134 MG/DL (ref 100–199)
CO2 SERPL-SCNC: 26 MMOL/L (ref 20–33)
CREAT SERPL-MCNC: 0.98 MG/DL (ref 0.5–1.4)
EOSINOPHIL # BLD AUTO: 0.01 K/UL (ref 0–0.51)
EOSINOPHIL NFR BLD: 0.1 % (ref 0–6.9)
ERYTHROCYTE [DISTWIDTH] IN BLOOD BY AUTOMATED COUNT: 46.1 FL (ref 35.9–50)
GFR SERPL CREATININE-BSD FRML MDRD: >60 ML/MIN/1.73 M 2
GLOBULIN SER CALC-MCNC: 3.3 G/DL (ref 1.9–3.5)
GLUCOSE SERPL-MCNC: 196 MG/DL (ref 65–99)
HCT VFR BLD AUTO: 45.1 % (ref 42–52)
HDLC SERPL-MCNC: 37 MG/DL
HGB BLD-MCNC: 14.7 G/DL (ref 14–18)
IMM GRANULOCYTES # BLD AUTO: 0.03 K/UL (ref 0–0.11)
IMM GRANULOCYTES NFR BLD AUTO: 0.4 % (ref 0–0.9)
INR PPP: 2.48 (ref 0.87–1.13)
LDLC SERPL CALC-MCNC: 85 MG/DL
LYMPHOCYTES # BLD AUTO: 0.61 K/UL (ref 1–4.8)
LYMPHOCYTES NFR BLD: 8.2 % (ref 22–41)
MCH RBC QN AUTO: 28.2 PG (ref 27–33)
MCHC RBC AUTO-ENTMCNC: 32.6 G/DL (ref 33.7–35.3)
MCV RBC AUTO: 86.6 FL (ref 81.4–97.8)
MONOCYTES # BLD AUTO: 0.03 K/UL (ref 0–0.85)
MONOCYTES NFR BLD AUTO: 0.4 % (ref 0–13.4)
NEUTROPHILS # BLD AUTO: 6.76 K/UL (ref 1.82–7.42)
NEUTROPHILS NFR BLD: 90.8 % (ref 44–72)
NRBC # BLD AUTO: 0 K/UL
NRBC BLD AUTO-RTO: 0 /100 WBC
PLATELET # BLD AUTO: 177 K/UL (ref 164–446)
PMV BLD AUTO: 9.5 FL (ref 9–12.9)
POTASSIUM SERPL-SCNC: 4 MMOL/L (ref 3.6–5.5)
PROT SERPL-MCNC: 7.2 G/DL (ref 6–8.2)
PROTHROMBIN TIME: 27.6 SEC (ref 12–14.6)
RBC # BLD AUTO: 5.21 M/UL (ref 4.7–6.1)
SODIUM SERPL-SCNC: 140 MMOL/L (ref 135–145)
TRIGL SERPL-MCNC: 60 MG/DL (ref 0–149)
TROPONIN I SERPL-MCNC: 0.06 NG/ML (ref 0–0.04)
TROPONIN I SERPL-MCNC: 0.09 NG/ML (ref 0–0.04)
TSH SERPL DL<=0.005 MIU/L-ACNC: 0.29 UIU/ML (ref 0.3–3.7)
WBC # BLD AUTO: 7.5 K/UL (ref 4.8–10.8)

## 2017-05-23 PROCEDURE — 700101 HCHG RX REV CODE 250: Performed by: HOSPITALIST

## 2017-05-23 PROCEDURE — 85025 COMPLETE CBC W/AUTO DIFF WBC: CPT

## 2017-05-23 PROCEDURE — 700105 HCHG RX REV CODE 258: Performed by: HOSPITALIST

## 2017-05-23 PROCEDURE — A9270 NON-COVERED ITEM OR SERVICE: HCPCS | Performed by: HOSPITALIST

## 2017-05-23 PROCEDURE — 84484 ASSAY OF TROPONIN QUANT: CPT

## 2017-05-23 PROCEDURE — 700102 HCHG RX REV CODE 250 W/ 637 OVERRIDE(OP): Performed by: PHARMACIST

## 2017-05-23 PROCEDURE — 96375 TX/PRO/DX INJ NEW DRUG ADDON: CPT

## 2017-05-23 PROCEDURE — G0378 HOSPITAL OBSERVATION PER HR: HCPCS

## 2017-05-23 PROCEDURE — 700102 HCHG RX REV CODE 250 W/ 637 OVERRIDE(OP): Performed by: HOSPITALIST

## 2017-05-23 PROCEDURE — 85610 PROTHROMBIN TIME: CPT

## 2017-05-23 PROCEDURE — 96365 THER/PROPH/DIAG IV INF INIT: CPT

## 2017-05-23 PROCEDURE — 84443 ASSAY THYROID STIM HORMONE: CPT

## 2017-05-23 PROCEDURE — 99217 PR OBSERVATION CARE DISCHARGE: CPT | Performed by: INTERNAL MEDICINE

## 2017-05-23 PROCEDURE — 83880 ASSAY OF NATRIURETIC PEPTIDE: CPT

## 2017-05-23 PROCEDURE — 96367 TX/PROPH/DG ADDL SEQ IV INF: CPT

## 2017-05-23 PROCEDURE — 96366 THER/PROPH/DIAG IV INF ADDON: CPT

## 2017-05-23 PROCEDURE — A9270 NON-COVERED ITEM OR SERVICE: HCPCS | Performed by: PHARMACIST

## 2017-05-23 PROCEDURE — 700111 HCHG RX REV CODE 636 W/ 250 OVERRIDE (IP): Performed by: HOSPITALIST

## 2017-05-23 PROCEDURE — 36415 COLL VENOUS BLD VENIPUNCTURE: CPT

## 2017-05-23 PROCEDURE — 700111 HCHG RX REV CODE 636 W/ 250 OVERRIDE (IP): Performed by: NURSE PRACTITIONER

## 2017-05-23 PROCEDURE — 80061 LIPID PANEL: CPT

## 2017-05-23 PROCEDURE — 80053 COMPREHEN METABOLIC PANEL: CPT

## 2017-05-23 RX ORDER — PREDNISONE 20 MG/1
40 TABLET ORAL DAILY
Status: DISCONTINUED | OUTPATIENT
Start: 2017-05-23 | End: 2017-05-23 | Stop reason: HOSPADM

## 2017-05-23 RX ORDER — CEFDINIR 300 MG/1
300 CAPSULE ORAL 2 TIMES DAILY
Qty: 20 CAP | Refills: 0 | Status: SHIPPED | OUTPATIENT
Start: 2017-05-23 | End: 2017-05-27

## 2017-05-23 RX ORDER — PREDNISONE 20 MG/1
40 TABLET ORAL DAILY
Qty: 30 TAB | Refills: 0 | Status: SHIPPED | OUTPATIENT
Start: 2017-05-23 | End: 2017-05-27

## 2017-05-23 RX ORDER — GUAIFENESIN/DEXTROMETHORPHAN 100-10MG/5
10 SYRUP ORAL EVERY 6 HOURS PRN
Qty: 240 ML | Refills: 0 | Status: SHIPPED | OUTPATIENT
Start: 2017-05-23

## 2017-05-23 RX ORDER — WARFARIN SODIUM 2 MG/1
2 TABLET ORAL
Status: DISCONTINUED | OUTPATIENT
Start: 2017-05-23 | End: 2017-05-23 | Stop reason: HOSPADM

## 2017-05-23 RX ORDER — DOXYCYCLINE 100 MG/1
100 TABLET ORAL 2 TIMES DAILY
Qty: 8 TAB | Refills: 0 | Status: SHIPPED | OUTPATIENT
Start: 2017-05-23 | End: 2017-05-27

## 2017-05-23 RX ADMIN — ALLOPURINOL 150 MG: 100 TABLET ORAL at 11:01

## 2017-05-23 RX ADMIN — DILTIAZEM HYDROCHLORIDE 90 MG: 90 TABLET, FILM COATED ORAL at 00:58

## 2017-05-23 RX ADMIN — POTASSIUM CHLORIDE 20 MEQ: 1500 TABLET, EXTENDED RELEASE ORAL at 00:17

## 2017-05-23 RX ADMIN — CEFTRIAXONE SODIUM 2 G: 2 INJECTION, POWDER, FOR SOLUTION INTRAMUSCULAR; INTRAVENOUS at 00:16

## 2017-05-23 RX ADMIN — STANDARDIZED SENNA CONCENTRATE AND DOCUSATE SODIUM 2 TABLET: 8.6; 5 TABLET, FILM COATED ORAL at 09:05

## 2017-05-23 RX ADMIN — WARFARIN SODIUM 2 MG: 2 TABLET ORAL at 00:58

## 2017-05-23 RX ADMIN — BUDESONIDE AND FORMOTEROL FUMARATE DIHYDRATE 2 PUFF: 80; 4.5 AEROSOL RESPIRATORY (INHALATION) at 00:58

## 2017-05-23 RX ADMIN — LOSARTAN POTASSIUM 100 MG: 50 TABLET, FILM COATED ORAL at 09:05

## 2017-05-23 RX ADMIN — DOXYCYCLINE 100 MG: 100 INJECTION, POWDER, LYOPHILIZED, FOR SOLUTION INTRAVENOUS at 09:37

## 2017-05-23 RX ADMIN — BUDESONIDE AND FORMOTEROL FUMARATE DIHYDRATE 2 PUFF: 80; 4.5 AEROSOL RESPIRATORY (INHALATION) at 09:07

## 2017-05-23 RX ADMIN — DOXYCYCLINE 100 MG: 100 INJECTION, POWDER, LYOPHILIZED, FOR SOLUTION INTRAVENOUS at 03:39

## 2017-05-23 RX ADMIN — OMEPRAZOLE 20 MG: 20 CAPSULE, DELAYED RELEASE ORAL at 09:04

## 2017-05-23 RX ADMIN — CARVEDILOL 25 MG: 6.25 TABLET, FILM COATED ORAL at 09:03

## 2017-05-23 RX ADMIN — FUROSEMIDE 40 MG: 10 INJECTION, SOLUTION INTRAVENOUS at 00:17

## 2017-05-23 RX ADMIN — DILTIAZEM HYDROCHLORIDE 90 MG: 90 TABLET, FILM COATED ORAL at 09:10

## 2017-05-23 RX ADMIN — ATORVASTATIN CALCIUM 40 MG: 40 TABLET, FILM COATED ORAL at 00:17

## 2017-05-23 RX ADMIN — POTASSIUM CHLORIDE 20 MEQ: 1500 TABLET, EXTENDED RELEASE ORAL at 09:04

## 2017-05-23 RX ADMIN — ASPIRIN 81 MG: 81 TABLET ORAL at 09:04

## 2017-05-23 RX ADMIN — METHYLPREDNISOLONE SODIUM SUCCINATE 40 MG: 40 INJECTION, POWDER, FOR SOLUTION INTRAMUSCULAR; INTRAVENOUS at 03:39

## 2017-05-23 RX ADMIN — PREDNISONE 40 MG: 20 TABLET ORAL at 09:04

## 2017-05-23 RX ADMIN — FUROSEMIDE 40 MG: 40 TABLET ORAL at 09:04

## 2017-05-23 RX ADMIN — DRONEDARONE 400 MG: 400 TABLET, FILM COATED ORAL at 09:09

## 2017-05-23 ASSESSMENT — PAIN SCALES - GENERAL
PAINLEVEL_OUTOF10: 0

## 2017-05-23 ASSESSMENT — LIFESTYLE VARIABLES
HAVE PEOPLE ANNOYED YOU BY CRITICIZING YOUR DRINKING: NO
EVER_SMOKED: YES
EVER_SMOKED: NEVER
HOW MANY TIMES IN THE PAST YEAR HAVE YOU HAD 5 OR MORE DRINKS IN A DAY: 0
TOTAL SCORE: 0
EVER HAD A DRINK FIRST THING IN THE MORNING TO STEADY YOUR NERVES TO GET RID OF A HANGOVER: NO
TOTAL SCORE: 0
AVERAGE NUMBER OF DAYS PER WEEK YOU HAVE A DRINK CONTAINING ALCOHOL: 1
ALCOHOL_USE: YES
TOTAL SCORE: 0
CONSUMPTION TOTAL: NEGATIVE
EVER FELT BAD OR GUILTY ABOUT YOUR DRINKING: NO
HAVE YOU EVER FELT YOU SHOULD CUT DOWN ON YOUR DRINKING: NO
ON A TYPICAL DAY WHEN YOU DRINK ALCOHOL HOW MANY DRINKS DO YOU HAVE: 1

## 2017-05-23 ASSESSMENT — COPD QUESTIONNAIRES
DO YOU EVER COUGH UP ANY MUCUS OR PHLEGM?: YES, EVERY DAY
DO YOU EVER COUGH UP ANY MUCUS OR PHLEGM?: YES, EVERY DAY
DURING THE PAST 4 WEEKS HOW MUCH DID YOU FEEL SHORT OF BREATH: SOME OF THE TIME
DURING THE PAST 4 WEEKS HOW MUCH DID YOU FEEL SHORT OF BREATH: SOME OF THE TIME
HAVE YOU SMOKED AT LEAST 100 CIGARETTES IN YOUR ENTIRE LIFE: YES
COPD SCREENING SCORE: 8
COPD SCREENING SCORE: 8
HAVE YOU SMOKED AT LEAST 100 CIGARETTES IN YOUR ENTIRE LIFE: YES

## 2017-05-23 ASSESSMENT — CHA2DS2 SCORE
VASCULAR DISEASE: YES
AGE 65 TO 74: YES
SEX: MALE
HYPERTENSION: YES
PRIOR STROKE OR TIA OR THROMBOEMBOLISM: NO
AGE 75 OR GREATER: NO
CHF OR LEFT VENTRICULAR DYSFUNCTION: YES
DIABETES: NO
CHA2DS2 VASC SCORE: 4

## 2017-05-23 NOTE — ED PROVIDER NOTES
ED Provider Note    CHIEF COMPLAINT  Chief Complaint   Patient presents with   • Shortness of Breath     sudden onset        HPI  Hal Nava is a 71 y.o. male who presents to the emergency department complaining of shortness of breath and a hoarse voice. The patient is also complaining of left-sided chest discomfort he says it feels like someone is pushing on his chest. The patient says he is generally not felt well for the last couple of days and this morning he tried whiskey and Vicodin but that did not seem to lead to any improvement. He lost his albuterol inhaler. Tonight he was very short of breath and called EMS and he was given a nebulizer treatment which led to very good relief of his symptoms. He does not recognize any other exacerbating or alleviating factors or precipitating events.    REVIEW OF SYSTEMS no hemoptysis no fever no abdominal or back pain. All other systems negative    PAST MEDICAL HISTORY  Past Medical History   Diagnosis Date   • Dyslipidemia    • Gout    • Atrial fibrillation (CMS-HCC)    • HTN (hypertension)    • S/P aortic valve replacement    • Indigestion    • Breath shortness    • Snoring    • Arthritis      gout, osteo hands, feet knees   • Bronchitis 12/2014   • Pneumonia 2/2015   • Asthma      uses inhalers   • FREDDY on CPAP      cpap   • Heart murmur    • AS (aortic stenosis) of bioprosthetic valve and needs TAVR 6/14/2015   • Hypertension    • GERD (gastroesophageal reflux disease)    • CHF (congestive heart failure) (CMS-HCC)    • Allergy    • COPD (chronic obstructive pulmonary disease) with chronic bronchitis (CMS-HCC) 4/15/2016       FAMILY HISTORY  Family History   Problem Relation Age of Onset   • Heart Disease Mother      CAD s/p CABG   • Diabetes Mother    • Cancer Mother      breast   • Heart Attack Father 1943   • Stroke Father    • Diabetes Father      type 2   • Hypertension Sister    • Diabetes Sister      prediabetes   • Hyperlipidemia Sister        SOCIAL  "HISTORY  Social History     Social History   • Marital Status:      Spouse Name: N/A   • Number of Children: 1   • Years of Education: N/A     Occupational History   • retired ,       Social History Main Topics   • Smoking status: Former Smoker -- 1.00 packs/day for 15 years     Types: Cigarettes     Quit date: 01/01/1984   • Smokeless tobacco: Never Used      Comment: quit in 1984   • Alcohol Use: 1.2 oz/week     1 Glasses of wine, 1 Cans of beer per week      Comment: rarely   • Drug Use: No   • Sexual Activity:     Partners: Female     Other Topics Concern   • None     Social History Narrative       SURGICAL HISTORY  Past Surgical History   Procedure Laterality Date   • Aortic valve replacement  2004, 2015     bovine valve   • Septoplasty     • Colonoscopy  1/3/11     colonic ileo cecal valve prominent fold-prominent lymphoid tissue   • Ethmoidectomy  4/7/2015     [facility]formed [facility]zmán [facility]   • Antrostomy  4/7/2015     Performed by Gopal Guzmán M.D. at [facility]   • Kyphoplasty  6/15       CURRENT MEDICATIONS  Home Medications     **Home medications have not yet been reviewed for this encounter**          ALLERGIES  Allergies   Allergen Reactions   • Advair [Fluticasone-Salmeterol]      Headache   • Benadryl [Altaryl]      Per neurologist   • Codeine      Constipation   • Prozac [Fluoxetine]      Excessive sedation   • Shellfish Allergy Anaphylaxis   • Simvastatin      myalgia-per pt.   • Symbicort [Budesonide-Formoterol Fumarate]    • Zyprexa      Excessive sedation   • Pcn [Penicillins] Rash and Hives     2003-12-04;rash  Per patient       PHYSICAL EXAM  VITAL SIGNS: /88 mmHg  Pulse 83  Resp 20  Ht 1.803 m (5' 11\")  Wt 111.131 kg (245 lb)  BMI 34.19 kg/m2  SpO2 95%   Oxygen saturation is interpreted as adequate  Constitutional: Awake and verbal and nontoxic appearing  HENT: Mucous membranes are moist " and throat clear  Eyes: No erythema or discharge or jaundice  Neck: Trachea midline no JVD  Cardiovascular: Regular rate and rhythm  Lungs: Distant bilaterally  Abdomen/Back: Obese soft nontender no peritoneal findings  Skin: Warm and dry  Musculoskeletal: No leg edema or calf tenderness  Neurologic: Awake and verbal and moving all extremities    Laboratory  A CBC shows a white blood cell count of 9.7 hemoglobin adequate at 14 complete metabolic panel is unremarkable except for slightly elevated blood glucose 147. Blood alcohol level is 0 troponin is at the upper limits of normal at 0.04 and a BNP is 31. INR is therapeutic at 2.59    EKG interpretation  12-lead EKG shows a sinus rhythm 94 bpm there is no pathologic ST elevation or depression or ectopy    Radiology  DX-CHEST-PORTABLE (1 VIEW)   Final Result      1.  Minimal RIGHT lung base atelectasis.   2.  No lobar pneumonia or pulmonary edema.   3.  Stable cardiomegaly.        MEDICAL DECISION MAKING and DISPOSITION  In the emergency department an IV was established the patient was given intravenous fluids and Solu-Medrol and oral aspirin. I reviewed the findings thus far available with him and I reviewed the case with the hospitalist and the patient will be admitted for further evaluation and further treatment    IMPRESSION  1. COPD with exacerbation  2. Chest pain      Electronically signed by: Lencho Guadarrama, 5/22/2017 9:55 PM

## 2017-05-23 NOTE — PROGRESS NOTES
"Bedside report received 0715. POC discussed with pt; Pt denies SOB, CP and dizziness, States,\" Im feeling a lot better this morning; Denies Pain; all questions answered and needs met at this time.   "

## 2017-05-23 NOTE — H&P
DATE OF ADMISSION:  5/22/2017    DATE OF SERVICE:  5/22/2017    IDENTIFICATION:  A 71-year-old male.    PRIMARY CARE PHYSICIAN:  Sandy Sahni MD    CARDIOLOGIST:  Conrad Fofana MD    CHIEF COMPLAINT:  Shortness of breath and mild chest discomfort.    HISTORY OF PRESENT ILLNESS:  This is a 71-year-old male with a history of   COPD, oxygen dependent on 2 liters as well as obstructive sleep apnea, history   of heart failure with preserved ejection fraction.  The patient just came   back from [place] from a trip apparently.  He arrives and complains of   shortness of breath, which has increased, some wheezing.  He is chronically   anticoagulated.  He presents to the emergency room with these complaints.    Workup here is fairly negative including a troponin that is negative.  A Chest   x-ray that shows some possible atelectasis, but no definitive infiltrates.    The patient will be admitted for likely a COPD exacerbation and mild   congestive heart failure.  The patient currently is much more rested.  He   denies any pain at this time.  He is compliant with his medication regimen.    He denies any recent increase in weight or lower extremity edema.  The patient   will be admitted for these complaints.    ALLERGIES:  TO PENICILLIN, PER HIS REPORT, AS WELL AS ALBUTEROL, CODEINE,   BENADRYL, FLUOXETINE, FLUTICASONE, OLANZAPINE, SHELLFISH, SIMVASTATIN AND   ZYPREXA.    MEDICATIONS REGIMEN:  On admission as follows:  1.  Allopurinol 300 mg half tablet daily.  2.  Lipitor 40 mg daily.  3.  Symbicort 2 puffs b.i.d.  4.  Coreg 25 mg p.o. b.i.d.  5.  Diltiazem 90 mg p.o. b.i.d.  6.  Multaq 400 mg p.o. b.i.d.  7.  Lasix 40 mg p.o. daily.  8.  Atrovent inhaled p.r.n. q. 6.  9.  Cozaar 100 mg daily.  10.  Omeprazole 20 mg daily.  11.  Potassium chloride 20 mEq b.i.d.  12.  Spiriva 18 mcg daily.  13.  Coumadin 4 mg daily.    PAST MEDICAL HISTORY:  1.  Paroxysmal atrial fibrillation.  2.  Congestive heart failure with ejection  fraction of 65%, heart failure with   preserved ejection fraction.  3.  COPD, chronically on 2 liters.  4.  Obstructive sleep apnea with CPAP.  4.  Dyslipidemia.  5.  Gout.  6.  Obesity.    PAST SURGICAL HISTORY:  History of aortic valve replacement approximately 14   years ago and had a TAVR following in 2015, otherwise negative.    SOCIAL HISTORY:  The patient is an ex-smoker, 30 years ago, has about a   15-pack-year history of smoking.  Alcohol none.  Drugs none.  He is .    FAMILY HISTORY:  Positive for heart disease.    REVIEW OF SYSTEMS:  Negative per AMA and CMS criteria other than outlined in   HPI.  He does have a cough, which is productive of some brown sputum.    PHYSICAL EXAMINATION:  VITAL SIGNS:  The patient is found afebrile, heart rate 83, respiration 20,   blood pressure 132/80, patient is saturating 95% on 2 L.  GENERAL:  This is an overweight  male in no acute distress, no acute   respiratory distress.  Well developed, well nourished.  HEENT:  Normocephalic, atraumatic.  EOMI.  PERRLA.  Mucous membranes are   moist.  Nasopharynx is clear.  NECK:  Free range of motion.  No lymphadenopathy.  CHEST:  Normal bony structures.  LUNGS:  Bilateral end expiratory wheezes.  HEART:  Regular rate.  S1 and S2 appear normal.  No S3, S4.  ABDOMEN:  Protuberant.  There is no tenderness, no distention.  GENITOURINARY:  Normal external male genitalia.  RECTAL:  Exam is deferred.  MUSCULOSKELETAL:  No clubbing, cyanosis, or edema.  NEUROLOGIC:  The patient is alert and oriented x4.  Cranial nerves II-XII   grossly intact.  No sensory loss is elicited.    LABORATORY DATA AND IMAGING:  CBC is essentially normal.  Chemistry also   normal except for glucose of 147.  Diagnostic alcohol is negative.  A troponin   0.04.  BNP is 31.  INR is 2.59.  Chest x-ray is read as minimal right lung   base atelectasis, no lobar pneumonia, stable cardiomegaly.  EKG shows a sinus   rhythm at a rate of 94 without acute  ST-T changes.    ASSESSMENT AND PLAN:  1.  Dyspnea, likely a combination of chronic obstructive pulmonary disease   exacerbation and possible congestive heart failure exacerbation.  The patient   at this time will be admitted for chronic obstructive pulmonary disease   exacerbation and treated with steroids, antibiotics and breathing treatments,   respiratory protocol.  2.  Possible congestive heart failure.  The patient had an echocardiogram in   January of 2017, follow up with Dr. Fofana.  The patient will be slightly   diuresed.  He has a TAVR in place.  3.  History of aortic valve replacement with TAVR, seems to be functioning   well.  4.  History of atrial fibrillation, currently in sinus rhythm.  5.  History of obstructive sleep apnea, we will continue his CPAP.  6.  History of gout.  7.  History of tobacco abuse.  8.  Current wheezing.  9.  Recent travel, although the patient is fully anticoagulated on Coumadin, I   doubt the patient has a risk for thromboembolic conditions.    OVERALL PLAN:  The patient is admitted with dyspnea, wheezing, admission as   discussed above.  For full further details, please refer to the computer   system and paper chart.       ____________________________________     MD JOSEPHINE CUEVAS / SHANDRA    DD:  05/22/2017 22:54:00  DT:  05/22/2017 23:53:27    D#:  [id number]  Job#:  [id number]

## 2017-05-23 NOTE — PROGRESS NOTES
A/o,assessment completed per CDU,poc discussed,verbalized understanding,sating 95 % on 2L oxygen,not in any form of distress, sr on tele =hr89,tolerating po well,call button within reach,will continue to monitor.

## 2017-05-23 NOTE — ED NOTES
Room 22    [facility] for sudden shortness of breath. Pt states that she just flew back from [place] on Saturday night.  Does have Hx of CHF and COPD pt takes 2L at night on his CPAP but is not on O2 at home besides that.  Currently stating 91% on no O2    .  Chief Complaint   Patient presents with   • Shortness of Breath     sudden onset

## 2017-05-23 NOTE — PROGRESS NOTES
Inpatient Anticoagulation Service Note    Date: 5/23/2017  Reason for Anticoagulation: Atrial Fibrillation  Hemoglobin Value: 14.7  Hematocrit Value: 45.1  Lab Platelet Value: 177  Target INR: 2.0 to 3.0  INR from last 7 days     Date/Time INR Value    05/23/17 0555 (!)2.48    05/22/17 1920 (!)2.59        Dose from last 7 days     Date/Time Dose (mg)    05/23/17 0200 2        Average Dose (mg): 4  Significant Interactions: Antibiotics, Aspirin, Corticosteroids, Proton Pump Inhibitor, Statin, Other (Comments) Multaq  Bridge Therapy: No     Comments:   Pt presents to ED with c/o sudden onset SOB. Admitted for COPD exacerbation.  Pt on chronic anticoagulation with warfarin 4 mg po daily for afib with goal INR 2.0-3.0.  INR with slight downtrend today after lower than home dose given. New drug interactions with antibiotics and steroids.  Will give slightly lower dose again tonight due to drug interactions. Likely return to home dose tomorrow.  H/H WNL.    Plan:  Warfarin 2mg po again tonight, INR tomorrow.  Pharmacist suggested discharge dosing: Possibly the home dose pending drug interactions at discharge. INR within 3-5 days of discharge.     Jose Raul Ferreira, PharmD, BCPS

## 2017-05-23 NOTE — DISCHARGE PLANNING
Pt lives [place] elevation of 4000[place].    Care Transition Team Assessment    Information Source  Orientation : Oriented x 4  Information Given By: Patient  Who is responsible for making decisions for patient? : Patient         Elopement Risk  Legal Hold: No  Ambulatory or Self Mobile in Wheelchair: No-Not an Elopement Risk    Interdisciplinary Discharge Planning  Does Admitting Nurse Feel This Could be a Complex Discharge?: No  Primary Care Physician: Dr Sahni  Lives with - Patient's Self Care Capacity: Spouse  Support Systems: Spouse / Significant Other  Housing / Facility: [address]  Do You Take your Prescribed Medications Regularly: Yes  Able to Return to Previous ADL's: Yes  Mobility Issues: No  Prior Services: None  Patient Expects to be Discharged to:: home w/ spouse  Durable Medical Equipment: Home Oxygen, Other - Specify (CPap, uses Oxygen and Cpap for night only)  DME Provider / Phone: [facility]    Discharge Preparedness  What is your plan after discharge?: Home with help  What are your discharge supports?: Spouse  Prior Functional Level: Ambulatory, Drives Self, Independent with Activities of Daily Living    Functional Assesment  Prior Functional Level: Ambulatory, Drives Self, Independent with Activities of Daily Living         Vision / Hearing Impairment  Vision Impairment : Yes  Right Eye Vision: Wears Glasses  Left Eye Vision: Wears Glasses  Hearing Impairment : No                   Psychological Assessment  History of Substance Abuse: None    Discharge Risks or Barriers  Discharge risks or barriers?: No    Anticipated Discharge Information  Anticipated discharge disposition: Home

## 2017-05-23 NOTE — FACE TO FACE
Face to Face Note  -  Durable Medical Equipment    DEXTER Sultana - NPI: [id number]  I certify that this patient is under my care and that they had a durable medical equipment(DME)face to face encounter by myself that meets the physician DME face-to-face encounter requirements with this patient on:    Date of encounter:   Patient:                    MRN:                       YOB: 2017  Hal Nava  [id number]  1945     The encounter with the patient was in whole, or in part, for the following medical condition, which is the primary reason for durable medical equipment:  COPD    I certify that, based on my findings, the following durable medical equipment is medically necessary:  Oxygen. Increase to 2L at rest and with activity during day as well as his normal nocturnal 2L use    HOME O2 Saturation Measurements:(Values must be present for Home Oxygen orders)  Room air sat at rest: 89  Room air sat with amb: 86  With liters of O2: 2, O2 sat at rest with O2: 94  With Liters of O2: 3, O2 sat with amb with O2 : 91  Is the patient mobile?: Yes    My Clinical findings support the need for the above equipment due to:  Hypoxia    Supporting Symptoms: hypoxia, dyspnea, cough

## 2017-05-23 NOTE — ED NOTES
Med rec updated and complete  Allergies reviewed and updated.  Pt has not taken  Antibiotics in last 30 days.  Pt takes coumadin  For  Two heart valve replacements.

## 2017-05-23 NOTE — PROGRESS NOTES
Inpatient Anticoagulation Service Note    Date: 5/23/2017  Reason for Anticoagulation: Atrial Fibrillation   [id number] VASc Score: 4    Hemoglobin Value: 14.1  Hematocrit Value: 43.5  Lab Platelet Value: 184  Target INR: 2.0 to 3.0    INR from last 7 days     Date/Time INR Value    05/22/17 1920 (!)2.59        Dose from last 7 days     Date/Time Dose (mg)    05/23/17 0200 2        Average Dose (mg): 4  Significant Interactions: Antibiotics, Aspirin, Corticosteroids, Proton Pump Inhibitor, Statin, allopurinol  Bridge Therapy: No     Comments: Pt presents to ED with c/o sudden onset SOB. Pt on chronic anticoagulation with warfarin 4 mg po daily for afib with goal INR 2.0-3.0. Most recent INR therapeutic at 2.59. Multiple drug-drug interaction noted.    Plan: Will administer warfarin 2 mg po once and recheck INR. Pharmacy will monitor.     Pharmacist suggested discharge dosing: likely current home regimen     Marietta Oro, JessieD

## 2017-05-24 ENCOUNTER — TELEPHONE (OUTPATIENT)
Dept: PULMONOLOGY | Facility: HOSPICE | Age: 72
End: 2017-05-24

## 2017-05-24 ENCOUNTER — PATIENT OUTREACH (OUTPATIENT)
Dept: HEALTH INFORMATION MANAGEMENT | Facility: OTHER | Age: 72
End: 2017-05-24

## 2017-05-24 NOTE — PROGRESS NOTES
Pt dc'd home. IV and monitor removed; monitor room notified. Pt left unit via wheelchair with RN. Personal belongings and O2 tank with pt when leaving unit. Pt given discharge instructions prior to leaving unit including prescription and when to visit with physician; verbalizes understanding. Copy of discharge instructions with pt and in the chart.

## 2017-05-24 NOTE — DISCHARGE INSTRUCTIONS
Discharge Instructions    Discharged to home by car with relative. Discharged via wheelchair, hospital escort: Yes.  Special equipment needed: Not Applicable    Be sure to schedule a follow-up appointment with your primary care doctor or any specialists as instructed.     Discharge Plan:   Influenza Vaccine Indication: Not indicated: Previously immunized this influenza season and > 8 years of age    I understand that a diet low in cholesterol, fat, and sodium is recommended for good health. Unless I have been given specific instructions below for another diet, I accept this instruction as my diet prescription.   Other diet: Heart Healthy    Special Instructions: None    · Is patient discharged on Warfarin / Coumadin?   No     · Is patient Post Blood Transfusion?  No    Depression / Suicide Risk    As you are discharged from this [facility] facility, it is important to learn how to keep safe from harming yourself.    Recognize the warning signs:  · Abrupt changes in personality, positive or negative- including increase in energy   · Giving away possessions  · Change in eating patterns- significant weight changes-  positive or negative  · Change in sleeping patterns- unable to sleep or sleeping all the time   · Unwillingness or inability to communicate  · Depression  · Unusual sadness, discouragement and loneliness  · Talk of wanting to die  · Neglect of personal appearance   · Rebelliousness- reckless behavior  · Withdrawal from people/activities they love  · Confusion- inability to concentrate     If you or a loved one observes any of these behaviors or has concerns about self-harm, here's what you can do:  · Talk about it- your feelings and reasons for harming yourself  · Remove any means that you might use to hurt yourself (examples: pills, rope, extension cords, firearm)  · Get professional help from the community (Mental Health, Substance Abuse, psychological counseling)  · Do not be alone:Call your Safe  Contact- someone whom you trust who will be there for you.  · Call your local CRISIS HOTLINE 872-0755 or 633-642-1878  · Call your local Children's Mobile Crisis Response Team [place] (196) 662-4542 or www.[employer]  · Call the toll free National Suicide Prevention Hotlines   · National Suicide Prevention Lifeline 155-117-FPDD (2827)  · Tailored [place] [facility] Line Network 800-SUICIDE (757-7032)      Heart Failure  Heart failure is a condition in which the heart has trouble pumping blood. This means your heart does not pump blood efficiently for your body to work well. In some cases of heart failure, fluid may back up into your lungs or you may have swelling (edema) in your lower legs. Heart failure is usually a long-term (chronic) condition. It is important for you to take good care of yourself and follow your health care provider's treatment plan.  CAUSES   Some health conditions can cause heart failure. Those health conditions include:  · High blood pressure (hypertension). Hypertension causes the heart muscle to work harder than normal. When pressure in the blood vessels is high, the heart needs to pump (contract) with more force in order to circulate blood throughout the body. High blood pressure eventually causes the heart to become stiff and weak.  · Coronary artery disease (CAD). CAD is the buildup of cholesterol and fat (plaque) in the arteries of the heart. The blockage in the arteries deprives the heart muscle of oxygen and blood. This can cause chest pain and may lead to a heart attack. High blood pressure can also contribute to CAD.  · Heart attack (myocardial infarction). A heart attack occurs when one or more arteries in the heart become blocked. The loss of oxygen damages the muscle tissue of the heart. When this happens, part of the heart muscle dies. The injured tissue does not contract as well and weakens the heart's ability to pump blood.  · Abnormal heart valves. When the heart valves do not  open and close properly, it can cause heart failure. This makes the heart muscle pump harder to keep the blood flowing.  · Heart muscle disease (cardiomyopathy or myocarditis). Heart muscle disease is damage to the heart muscle from a variety of causes. These can include drug or alcohol abuse, infections, or unknown reasons. These can increase the risk of heart failure.  · Lung disease. Lung disease makes the heart work harder because the lungs do not work properly. This can cause a strain on the heart, leading it to fail.  · Diabetes. Diabetes increases the risk of heart failure. High blood sugar contributes to high fat (lipid) levels in the blood. Diabetes can also cause slow damage to tiny blood vessels that carry important nutrients to the heart muscle. When the heart does not get enough oxygen and food, it can cause the heart to become weak and stiff. This leads to a heart that does not contract efficiently.  · Other conditions can contribute to heart failure. These include abnormal heart rhythms, thyroid problems, and low blood counts (anemia).  Certain unhealthy behaviors can increase the risk of heart failure, including:  · Being overweight.  · Smoking or chewing tobacco.  · Eating foods high in fat and cholesterol.  · Abusing illicit drugs or alcohol.  · Lacking physical activity.  SYMPTOMS   Heart failure symptoms may vary and can be hard to detect. Symptoms may include:  · Shortness of breath with activity, such as climbing stairs.  · Persistent cough.  · Swelling of the feet, ankles, legs, or abdomen.  · Unexplained weight gain.  · Difficulty breathing when lying flat (orthopnea).  · Waking from sleep because of the need to sit up and get more air.  · Rapid heartbeat.  · Fatigue and loss of energy.  · Feeling light-headed, dizzy, or close to fainting.  · Loss of appetite.  · Nausea.  · Increased urination during the night (nocturia).  DIAGNOSIS   A diagnosis of heart failure is based on your history,  symptoms, physical examination, and diagnostic tests. Diagnostic tests for heart failure may include:  · Echocardiography.  · Electrocardiography.  · Chest X-ray.  · Blood tests.  · Exercise stress test.  · Cardiac angiography.  · Radionuclide scans.  TREATMENT   Treatment is aimed at managing the symptoms of heart failure. Medicines, behavioral changes, or surgical intervention may be necessary to treat heart failure.  · Medicines to help treat heart failure may include:  ¨ Angiotensin-converting enzyme (ACE) inhibitors. This type of medicine blocks the effects of a blood protein called angiotensin-converting enzyme. ACE inhibitors relax (dilate) the blood vessels and help lower blood pressure.  ¨ Angiotensin receptor blockers (ARBs). This type of medicine blocks the actions of a blood protein called angiotensin. Angiotensin receptor blockers dilate the blood vessels and help lower blood pressure.  ¨ Water pills (diuretics). Diuretics cause the kidneys to remove salt and water from the blood. The extra fluid is removed through urination. This loss of extra fluid lowers the volume of blood the heart pumps.  ¨ Beta blockers. These prevent the heart from beating too fast and improve heart muscle strength.  ¨ Digitalis. This increases the force of the heartbeat.  · Healthy behavior changes include:  ¨ Obtaining and maintaining a healthy weight.  ¨ Stopping smoking or chewing tobacco.  ¨ Eating heart-healthy foods.  ¨ Limiting or avoiding alcohol.  ¨ Stopping illicit drug use.  ¨ Physical activity as directed by your health care provider.  · Surgical treatment for heart failure may include:  ¨ A procedure to open blocked arteries, repair damaged heart valves, or remove damaged heart muscle tissue.  ¨ A pacemaker to improve heart muscle function and control certain abnormal heart rhythms.  ¨ An internal cardioverter defibrillator to treat certain serious abnormal heart rhythms.  ¨ A left ventricular assist device (LVAD)  to assist the pumping ability of the heart.  HOME CARE INSTRUCTIONS   · Take medicines only as directed by your health care provider. Medicines are important in reducing the workload of your heart, slowing the progression of heart failure, and improving your symptoms.  ¨ Do not stop taking your medicine unless directed by your health care provider.  ¨ Do not skip any dose of medicine.  ¨ Refill your prescriptions before you run out of medicine. Your medicines are needed every day.  · Engage in moderate physical activity if directed by your health care provider. Moderate physical activity can benefit some people. The elderly and people with severe heart failure should consult with a health care provider for physical activity recommendations.  · Eat heart-healthy foods. Food choices should be free of trans fat and low in saturated fat, cholesterol, and salt (sodium). Healthy choices include fresh or frozen fruits and vegetables, fish, lean meats, legumes, fat-free or low-fat dairy products, and whole grain or high fiber foods. Talk to a dietitian to learn more about heart-healthy foods.  · Limit sodium if directed by your health care provider. Sodium restriction may reduce symptoms of heart failure in some people. Talk to a dietitian to learn more about heart-healthy seasonings.  · Use healthy cooking methods. Healthy cooking methods include roasting, grilling, broiling, baking, poaching, steaming, or stir-frying. Talk to a dietitian to learn more about healthy cooking methods.  · Limit fluids if directed by your health care provider. Fluid restriction may reduce symptoms of heart failure in some people.  · Weigh yourself every day. Daily weights are important in the early recognition of excess fluid. You should weigh yourself every morning after you urinate and before you eat breakfast. Wear the same amount of clothing each time you weigh yourself. Record your daily weight. Provide your health care provider with your  weight record.  · Monitor and record your blood pressure if directed by your health care provider.  · Check your pulse if directed by your health care provider.  · Lose weight if directed by your health care provider. Weight loss may reduce symptoms of heart failure in some people.  · Stop smoking or chewing tobacco. Nicotine makes your heart work harder by causing your blood vessels to constrict. Do not use nicotine gum or patches before talking to your health care provider.  · Keep all follow-up visits as directed by your health care provider. This is important.  · Limit alcohol intake to no more than 1 drink per day for nonpregnant women and 2 drinks per day for men. One drink equals 12 ounces of beer, 5 ounces of wine, or 1½ ounces of hard liquor. Drinking more than that is harmful to your heart. Tell your health care provider if you drink alcohol several times a week. Talk with your health care provider about whether alcohol is safe for you. If your heart has already been damaged by alcohol or you have severe heart failure, drinking alcohol should be stopped completely.  · Stop illicit drug use.  · Stay up-to-date with immunizations. It is especially important to prevent respiratory infections through current pneumococcal and influenza immunizations.  · Manage other health conditions such as hypertension, diabetes, thyroid disease, or abnormal heart rhythms as directed by your health care provider.  · Learn to manage stress.  · Plan rest periods when fatigued.  · Learn strategies to manage high temperatures. If the weather is extremely hot:  ¨ Avoid vigorous physical activity.  ¨ Use air conditioning or fans or seek a cooler location.  ¨ Avoid caffeine and alcohol.  ¨ Wear loose-fitting, lightweight, and light-colored clothing.  · Learn strategies to manage cold temperatures. If the weather is extremely cold:  ¨ Avoid vigorous physical activity.  ¨ Layer clothes.  ¨ Wear mittens or gloves, a hat, and a scarf  when going outside.  ¨ Avoid alcohol.  · Obtain ongoing education and support as needed.  · Participate in or seek rehabilitation as needed to maintain or improve independence and quality of life.  SEEK MEDICAL CARE IF:   · You have a rapid weight gain.  · You have increasing shortness of breath that is unusual for you.  · You are unable to participate in your usual physical activities.  · You tire easily.  · You cough more than normal, especially with physical activity.  · You have any or more swelling in areas such as your hands, feet, ankles, or abdomen.  · You are unable to sleep because it is hard to breathe.  · You feel like your heart is beating fast (palpitations).  · You become dizzy or light-headed upon standing up.  SEEK IMMEDIATE MEDICAL CARE IF:   · You have difficulty breathing.  · There is a change in mental status such as decreased alertness or difficulty with concentration.  · You have a pain or discomfort in your chest.  · You have an episode of fainting (syncope).  MAKE SURE YOU:   · Understand these instructions.  · Will watch your condition.  · Will get help right away if you are not doing well or get worse.     This information is not intended to replace advice given to you by your health care provider. Make sure you discuss any questions you have with your health care provider.     Document Released: 12/18/2006 Document Revised: 05/03/2016 Document Reviewed: 01/17/2014  [employer] Interactive Patient Education ©2016 [employer]  Chronic Obstructive Pulmonary Disease  Chronic obstructive pulmonary disease (COPD) is a common lung problem. In COPD, the flow of air from the lungs is limited. The way your lungs work will probably never return to normal, but there are things you can do to improve your lungs and make yourself feel better. Your doctor may treat your condition with:  · Medicines.  · Oxygen.  · Lung surgery.  · Changes to your diet.  · Rehabilitation. This may involve a team of  specialists.  HOME CARE  · Take all medicines as told by your doctor.  · Avoid medicines or cough syrups that dry up your airway (such as antihistamines) and do not allow you to get rid of thick spit. You do not need to avoid them if told differently by your doctor.  · If you smoke, stop. Smoking makes the problem worse.  · Avoid being around things that make your breathing worse (like smoke, chemicals, and fumes).  · Use oxygen therapy and therapy to help improve your lungs (pulmonary rehabilitation) if told by your doctor. If you need home oxygen therapy, ask your doctor if you should buy a tool to measure your oxygen level (oximeter).  · Avoid people who have a sickness you can catch (contagious).  · Avoid going outside when it is very hot, cold, or humid.  · Eat healthy foods. Eat smaller meals more often. Rest before meals.  · Stay active, but remember to also rest.  · Make sure to get all the shots (vaccines) your doctor recommends. Ask your doctor if you need a pneumonia shot.  · Learn and use tips on how to relax.  · Learn and use tips on how to control your breathing as told by your doctor. Try:  · Breathing in (inhaling) through your nose for 1 second. Then, pucker your lips and breath out (exhale) through your lips for 2 seconds.  · Putting one hand on your belly (abdomen). Breathe in slowly through your nose for 1 second. Your hand on your belly should move out. Pucker your lips and breathe out slowly through your lips. Your hand on your belly should move in as you breathe out.  · Learn and use controlled coughing to clear thick spit from your lungs. The steps are:  · Lean your head a little forward.  · Breathe in deeply.  · Try to hold your breath for 3 seconds.  · Keep your mouth slightly open while coughing 2 times.  · Spit any thick spit out into a tissue.  · Rest and do the steps again 1 or 2 times as needed.  GET HELP IF:  · You cough up more thick spit than usual.  · There is a change in the color  or thickness of the spit.  · It is harder to breathe than usual.  · Your breathing is faster than usual.  GET HELP RIGHT AWAY IF:  · You have shortness of breath while resting.  · You have shortness of breath that stops you from:  ¨ Being able to talk.  ¨ Doing normal activities.  · You chest hurts for longer than 5 minutes.  · Your skin color is more blue than usual.  · Your pulse oximeter shows that you have low oxygen for longer than 5 minutes.  MAKE SURE YOU:  · Understand these instructions.  · Will watch your condition.  · Will get help right away if you are not doing well or get worse.     This information is not intended to replace advice given to you by your health care provider. Make sure you discuss any questions you have with your health care provider.     Document Released: 06/05/2009 Document Revised: 01/08/2016 Document Reviewed: 08/14/2014  [employer] Interactive Patient Education ©2016 [employer]

## 2017-05-24 NOTE — TELEPHONE ENCOUNTER
----- Message from Your Healthcare Team sent at 5/23/2017  7:55 PM PDT -----  Regarding: Non-Urgent Medical Question  Contact: [phone]  Hal was admitted from the emergency room Monday night for shortness of breath.   He was sent home with orders for oxygen 24 hours per day until he sees a doctor and the orders are changed.  I contacted his primary care, Dr. Sahni, to make an appt with her.  He has an appt. with you in July.  Should he see you before that?    Thank you,    Renetta Nava

## 2017-05-25 NOTE — DISCHARGE SUMMARY
DATE OF ADMISSION:  05/22/2017    DATE OF DISCHARGE:  05/23/2017    PRIMARY CARE PROVIDER:  Sandy Sahni MD    PRIMARY CARDIOLOGIST:  Conrad Fofana MD    CONSULTATIONS:  None.    PROCEDURE:  None.    DISCHARGE DIAGNOSES:  1.  Chronic obstructive pulmonary disease with exacerbation.  2.  Congestive heart failure    CHRONIC MEDICAL PROBLEMS:  1.  Chronic obstructive pulmonary disease with chronic bronchitis.  2.  Gastroesophageal reflux disease.  3.  Essential hypertension.  4.  Aortic stenosis of bioprosthetic valve and TAVR.  5.  Chronic combined systolic and diastolic heart failure.  6.  Coronary artery disease.  7.  Obstructive sleep apnea on CPAP.  8.  Status post aortic valve replacement.  9.  Atrial fibrillation.  10.  Dyslipidemia.  11.  Chronic anticoagulation without evidence of bleeding.    RESULTS REVIEW:   Interval history/exam for day of discharge:    Filed Vitals:    05/23/17 0406 05/23/17 0729 05/23/17 1130 05/23/17 1600   BP: 186/92 185/88 127/71 139/63   Pulse: 86 84 74 73   Temp: 36.1 °C (97 °F) 36.6 °C (97.8 °F) 36.5 °C (97.7 °F) 36.6 °C (97.8 °F)   Resp: 17 15 18 18   Height:       Weight:       SpO2: 94% 94% 94% 91%     Weight/BMI: Body mass index is 34.73 kg/(m^2).         Most Recent Labs:    Lab Results   Component Value Date/Time    WBC 7.5 05/23/2017 03:47 AM    RBC 5.21 05/23/2017 03:47 AM    HEMOGLOBIN 14.7 05/23/2017 03:47 AM    HEMATOCRIT 45.1 05/23/2017 03:47 AM    MCV 86.6 05/23/2017 03:47 AM    MCH 28.2 05/23/2017 03:47 AM    MCHC 32.6* 05/23/2017 03:47 AM    MPV 9.5 05/23/2017 03:47 AM    NEUTROPHILS-POLYS 90.80* 05/23/2017 03:47 AM    LYMPHOCYTES 8.20* 05/23/2017 03:47 AM    MONOCYTES 0.40 05/23/2017 03:47 AM    EOSINOPHILS 0.10 05/23/2017 03:47 AM    BASOPHILS 0.10 05/23/2017 03:47 AM      Lab Results   Component Value Date/Time    SODIUM 140 05/23/2017 03:47 AM    POTASSIUM 4.0 05/23/2017 03:47 AM    CHLORIDE 104 05/23/2017 03:47 AM    CO2 26 05/23/2017 03:47 AM    GLUCOSE 196*  05/23/2017 03:47 AM    BUN 18 05/23/2017 03:47 AM    CREATININE 0.98 05/23/2017 03:47 AM      Lab Results   Component Value Date/Time    ALT(SGPT) 11 05/23/2017 03:47 AM    AST(SGOT) 18 05/23/2017 03:47 AM    ALKALINE PHOSPHATASE 98 05/23/2017 03:47 AM    TOTAL BILIRUBIN 0.4 05/23/2017 03:47 AM    ALBUMIN 3.9 05/23/2017 03:47 AM    GLOBULIN 3.3 05/23/2017 03:47 AM    INR 2.48* 05/23/2017 05:55 AM     Lab Results   Component Value Date/Time    PT 27.6* 05/23/2017 05:55 AM    INR 2.48* 05/23/2017 05:55 AM        Imaging/ Testing:      DX-CHEST-PORTABLE (1 VIEW)   Final Result      1.  Minimal RIGHT lung base atelectasis.   2.  No lobar pneumonia or pulmonary edema.   3.  Stable cardiomegaly.                MEDICATIONS:       Medication List      START taking these medications       Instructions    cefdinir 300 MG Caps   Commonly known as:  OMNICEF    Take 1 Cap by mouth 2 times a day for 4 days.   Dose:  300 mg       doxycycline monohydrate 100 MG tablet   Commonly known as:  ADOXA    Take 1 Tab by mouth 2 times a day for 4 days.   Dose:  100 mg       guaifenesin -10 MG/5ML Syrp syrup   Commonly known as:  ROBITUSSIN DM    Take 10 mL by mouth every 6 hours as needed for Cough.   Dose:  10 mL       predniSONE 20 MG Tabs   Last time this was given:  40 mg on 5/23/2017  9:04 AM   Commonly known as:  DELTASONE    Take 2 Tabs by mouth every day for 4 days.   Dose:  40 mg         CONTINUE taking these medications       Instructions    allopurinol 300 MG Tabs   Last time this was given:  150 mg on 5/23/2017 11:01 AM   Commonly known as:  ZYLOPRIM    Take 0.5 Tabs by mouth every day.   Dose:  150 mg       atorvastatin 40 MG Tabs   Last time this was given:  40 mg on 5/23/2017 12:17 AM   Commonly known as:  LIPITOR    TAKE 1 TABLET BY MOUTH ONCE DAILY IN THE EVENING.       budesonide-formoterol 80-4.5 MCG/ACT Aero   Last time this was given:  2 Puffs on 5/23/2017  9:07 AM   Commonly known as:  SYMBICORT    Inhale 2 Puffs  by mouth 2 Times a Day. Inhale 2 puffs by mouth twice daily with spacer. Rinse mouth after each use.   Dose:  2 Puff       carvedilol 25 MG Tabs   Last time this was given:  25 mg on 5/23/2017  9:03 AM   Commonly known as:  COREG    Take 1 Tab by mouth 2 times a day, with meals.   Dose:  25 mg       diltiazem 90 MG Tabs   Last time this was given:  90 mg on 5/23/2017  9:10 AM   Commonly known as:  CARDIZEM    TAKE ONE TABLET BY MOUTH TWICE DAILY       dronedarone 400 MG Tabs   Last time this was given:  400 mg on 5/23/2017  9:09 AM   Commonly known as:  MULTAQ    Take 400 mg by mouth 2 times a day, with meals.   Dose:  400 mg       furosemide 40 MG Tabs   Last time this was given:  40 mg on 5/23/2017  9:04 AM   Commonly known as:  LASIX    TAKE ONE TABLET BY MOUTH ONE TIME DAILY       ipratropium 17 MCG/ACT Aers   Commonly known as:  ATROVENT HFA    Inhale 2 Puffs by mouth every 6 hours as needed (for shortness of breath and wheezing.).   Dose:  2 Puff       losartan 100 MG Tabs   Last time this was given:  100 mg on 5/23/2017  9:05 AM   Commonly known as:  COZAAR    TAKE ONE TABLET BY MOUTH ONE TIME DAILY       omeprazole 20 MG delayed-release capsule   Last time this was given:  20 mg on 5/23/2017  9:04 AM   Commonly known as:  PRILOSEC    TAKE ONE CAPSULE BY MOUTH ONE TIME DAILY       potassium chloride SA 20 MEQ Tbcr   Last time this was given:  20 mEq on 5/23/2017  9:04 AM   Commonly known as:  Kdur    Take 1 Tab by mouth 2 times a day.   Dose:  20 mEq       Tiotropium Bromide Monohydrate 2.5 MCG/ACT Aers   Commonly known as:  SPIRIVA RESPIMAT    Inhale 2 Inhalation by mouth every day.   Dose:  2 Inhalation       warfarin 4 MG Tabs   Last time this was given:  2 mg on 5/23/2017 12:58 AM   Commonly known as:  COUMADIN    Take 4 mg by mouth every evening.   Dose:  4 mg               HOSPITAL COURSE AND DECISION MAKING:  Please see H and P dictated by Dr. Christoph Fuchs on 05/22.  In brief, this is a  71-year-old male with a   history of COPD, dependent on 2 liters, primarily at night as well as FREDDY,   congestive heart failure with preserved ejection fraction that presented with   shortness of breath and mild chest discomfort.  He was found to have a mild   CHF and COPD exacerbation.  He was maintained on 2 liters of oxygen   continuously, which we were unable to completely wean him off of so we will be   recommended to continue to have 2 liters of home.  This order was placed.  He   had significant improvement of wheezing, shortness of breath with steroids   and continued breathing treatments with respiratory therapy.  He was also   placed on antibiotics for 5 days for likely bronchitis component.  He will   follow up with his primary care provider within the next 1-2 weeks.  We are   also recommending that he get a recheck of his TSH as it was mildly low.  He   again will go home on a 5-day course of prednisone, Omnicef, and doxycycline.    It is recommended to continue his COPD medications and nebulizers at home as   well as 2 liters of O2.  He may review his 2 liters of oxygen with Dr. Sahni   and see if he needs to continue on with that.  He has verbalized understanding   of these discharge instructions.  May resume activities as tolerated and   needs a heart healthy diet.    Instructions:      The patient was instructed to return to the ER in the event of worsening symptoms. I have counseled the patient on the importance of compliance and the patient has agreed to proceed with all medical recommendations and follow up plan indicated above.   The patient understands that all medications come with benefits and risks. Risks may include permanent injury or death and these risks can be minimized with close reassessment and monitoring.        Opioid prescription history checked: no    Time spent in coordination of discharge was 35 minutes. This included face to face with the patient, medication reconciliation,  care co ordination with RN involved in patient care and discussion and co ordination with case management.          ____________________________________     BABITA Jackson / SHANDRA    DD:  05/23/2017 16:26:43  DT:  05/24/2017 02:27:56    D#:  [id number]  Job#:  [id number]

## 2017-05-29 DIAGNOSIS — Z79.899 ON POTASSIUM WASTING DIURETIC THERAPY: ICD-10-CM

## 2017-05-29 RX ORDER — POTASSIUM CHLORIDE 20 MEQ/1
20 TABLET, EXTENDED RELEASE ORAL 2 TIMES DAILY
Qty: 60 TAB | Refills: 10 | OUTPATIENT
Start: 2017-05-29

## 2017-05-30 DIAGNOSIS — Z79.899 ON POTASSIUM WASTING DIURETIC THERAPY: ICD-10-CM

## 2017-06-08 ENCOUNTER — OFFICE VISIT (OUTPATIENT)
Dept: PULMONOLOGY | Facility: HOSPICE | Age: 72
End: 2017-06-08
Payer: MEDICARE

## 2017-06-08 VITALS
HEART RATE: 60 BPM | WEIGHT: 252 LBS | TEMPERATURE: 97.2 F | SYSTOLIC BLOOD PRESSURE: 122 MMHG | BODY MASS INDEX: 35.28 KG/M2 | HEIGHT: 71 IN | OXYGEN SATURATION: 93 % | RESPIRATION RATE: 16 BRPM | DIASTOLIC BLOOD PRESSURE: 68 MMHG

## 2017-06-08 DIAGNOSIS — G47.33 OSA (OBSTRUCTIVE SLEEP APNEA): ICD-10-CM

## 2017-06-08 DIAGNOSIS — J44.9 CHRONIC OBSTRUCTIVE PULMONARY DISEASE, UNSPECIFIED COPD TYPE (HCC): ICD-10-CM

## 2017-06-08 PROBLEM — E66.9 OBESITY (BMI 30-39.9): Status: RESOLVED | Noted: 2017-04-07 | Resolved: 2017-06-08

## 2017-06-08 PROBLEM — J44.1 COPD WITH EXACERBATION (HCC): Status: RESOLVED | Noted: 2017-05-22 | Resolved: 2017-06-08

## 2017-06-08 PROCEDURE — 99214 OFFICE O/P EST MOD 30 MIN: CPT | Performed by: NURSE PRACTITIONER

## 2017-06-08 PROCEDURE — 94761 N-INVAS EAR/PLS OXIMETRY MLT: CPT | Performed by: NURSE PRACTITIONER

## 2017-06-08 RX ORDER — IPRATROPIUM BROMIDE AND ALBUTEROL SULFATE 2.5; .5 MG/3ML; MG/3ML
3 SOLUTION RESPIRATORY (INHALATION) EVERY 4 HOURS PRN
Qty: 120 VIAL | Refills: 0 | Status: SHIPPED | OUTPATIENT
Start: 2017-06-08

## 2017-06-08 NOTE — MR AVS SNAPSHOT
"        Hal Nava   2017 3:40 PM   Office Visit   MRN: [id number]    Department:  [facility]   Dept Phone:  [phone]    Description:  Male : 1945   Provider:  MARIOLA Galindo           Reason for Visit     Follow-Up last seen 17, HOS [facility] 17      Allergies as of 2017     Allergen Noted Reactions    Albuterol 2017   Unspecified    Severe headache      Codeine 2014       Constipation    Diphenhydramine 2017   Unspecified    PER NEUROLOGIST    Fluoxetine 2017       Excessive sedation    Fluticasone-Salmeterol 2017       Headache    Olanzapine 2017   Unspecified    SEDATION    Shellfish Allergy 2015   Anaphylaxis    Simvastatin 10/19/2016       myalgia-per pt.    Zyprexa 2014       Excessive sedation    Pcn [Penicillins] 2014   Hives, Rash    2003;rash  Per patient  Tolerates rocephin      You were diagnosed with     Chronic obstructive pulmonary disease, unspecified COPD type (CMS-[facility])   [8570898]         Vital Signs     Blood Pressure Pulse Temperature Respirations Height Weight    122/68 mmHg 60 36.2 °C (97.2 °F) 16 1.803 m (5' 10.98\") 114.306 kg (252 lb)    Body Mass Index Oxygen Saturation Smoking Status             35.16 kg/m2 93% Former Smoker         Basic Information     Date Of Birth Sex Race Ethnicity Preferred Language    1945 Male White Non- English      Your appointments     2017 10:00 AM   Established Patient Pul with DEXTER Oconnor   [facility] (--)    [address]  [facility] [address]  [facility] [address] [phone]            Aug 10, 2017  9:00 AM   Established Patient with Sandy Sahni M.D.   [facility] (--)    [address]  [address]  [facility] [address] [phone]           You will be receiving a confirmation call a few days before your appointment from our automated call confirmation system.            " 2017 11:20 AM   Established Patient Pul with MARIOLA Galindo   [facility] (--)    [address]  [address]  [place] [id number] [phone]              Problem List              ICD-10-CM Priority Class Noted - Resolved    Dyslipidemia (Chronic) E78.5 Medium  Unknown - Present    Gout M10.9 Low  Unknown - Present    Atrial fibrillation (CMS-HCC) (Chronic) I48.91 Medium  Unknown - Present    S/P aortic valve replacement (Chronic) Z95.2 Medium  Unknown - Present    GERD (gastroesophageal reflux disease) (Chronic) K21.9 Low  9/11/2014 - Present    FREDDY on CPAP (Chronic) G47.33, Z99.89 Medium  Unknown - Present    Chronic anticoagulation (Chronic) Z79.01 Low  1/13/2015 - Present    Chronic maxillary sinusitis J32.0 Low  4/7/2015 - Present    CAD (coronary artery disease) (Chronic) I25.10 Medium  4/8/2015 - Present    Hx of Hyperglycemia R73.9 Low  4/9/2015 - Present    Anemia D64.9 Low  4/9/2015 - Present    Chronic combined systolic and diastolic heart failure (CMS-HCC) (Chronic) I50.42 Medium  4/16/2015 - Present    AS (aortic stenosis) of bioprosthetic valve and needs TAVR (Chronic) I35.0 Medium  6/14/2015 - Present    Essential hypertension (Chronic) I10 Medium  6/23/2015 - Present    COPD (chronic obstructive pulmonary disease) with chronic bronchitis (CMS-HCC) (Chronic) J44.9 Low  4/15/2016 - Present    Obesity (BMI 30-39.9) E66.9   4/7/2017 - Present    CHF (congestive heart failure) (CMS-HCC) I50.9   5/22/2017 - Present    COPD with exacerbation (CMS-[facility]) J44.1   5/22/2017 - Present      Health Maintenance        Date Due Completion Dates    IMM DTaP/Tdap/Td Vaccine (1 - Tdap) 9/1/1964 ---    COLONOSCOPY 1/3/2021 1/3/2011 (Done)    Override on 1/3/2011: Done            Current Immunizations     13-VALENT PCV PREVNAR 4/15/2016  8:53 AM    Influenza Vaccine Adult HD 10/26/2016    Influenza Vaccine Quad Inj (Preserved) 10/20/2015, 1/15/2015    Pneumococcal polysaccharide  vaccine (PPSV-23) 4/11/2015  2:51 PM    SHINGLES VACCINE 10/3/2011      Below and/or attached are the medications your provider expects you to take. Review all of your home medications and newly ordered medications with your provider and/or pharmacist. Follow medication instructions as directed by your provider and/or pharmacist. Please keep your medication list with you and share with your provider. Update the information when medications are discontinued, doses are changed, or new medications (including over-the-counter products) are added; and carry medication information at all times in the event of emergency situations     Allergies:  ALBUTEROL - Unspecified     CODEINE - (reactions not documented)     DIPHENHYDRAMINE - Unspecified     FLUOXETINE - (reactions not documented)     FLUTICASONE-SALMETEROL - (reactions not documented)     OLANZAPINE - Unspecified     SHELLFISH ALLERGY - Anaphylaxis     SIMVASTATIN - (reactions not documented)     ZYPREXA - (reactions not documented)     PCN - Hives,Rash               Medications  Valid as of: June 08, 2017 -  4:17 PM    Generic Name Brand Name Tablet Size Instructions for use    Allopurinol (Tab) ZYLOPRIM 300 MG Take 0.5 Tabs by mouth every day.        Atorvastatin Calcium (Tab) LIPITOR 40 MG TAKE 1 TABLET BY MOUTH ONCE DAILY IN THE EVENING.        Budesonide-Formoterol Fumarate (Aerosol) SYMBICORT 80-4.5 MCG/ACT Inhale 2 Puffs by mouth 2 Times a Day. Inhale 2 puffs by mouth twice daily with spacer. Rinse mouth after each use.        Carvedilol (Tab) COREG 25 MG Take 1 Tab by mouth 2 times a day, with meals.        Dextromethorphan-Guaifenesin (Syrup) ROBITUSSIN -10 MG/5ML Take 10 mL by mouth every 6 hours as needed for Cough.        DilTIAZem HCl (Tab) CARDIZEM 90 MG TAKE ONE TABLET BY MOUTH TWICE DAILY        Dronedarone HCl (Tab) MULTAQ 400 MG Take 400 mg by mouth 2 times a day, with meals.        Furosemide (Tab) LASIX 40 MG TAKE ONE TABLET BY MOUTH ONE  TIME DAILY        Ipratropium Bromide HFA (Aero Soln) ATROVENT 17 MCG/ACT Inhale 2 Puffs by mouth every 6 hours as needed (for shortness of breath and wheezing.).        Ipratropium-Albuterol (Solution) DUONEB 0.5-2.5 (3) MG/3ML 3 mL by Nebulization route every four hours as needed for Shortness of Breath (Wheezing).        Losartan Potassium (Tab) COZAAR 100 MG TAKE ONE TABLET BY MOUTH ONE TIME DAILY        Omeprazole (CAPSULE DELAYED RELEASE) PRILOSEC 20 MG TAKE ONE CAPSULE BY MOUTH ONE TIME DAILY        Potassium Chloride Sarahy CR (Tab CR) Kdur 20 MEQ Take 1 Tab by mouth 2 times a day.        Tiotropium Bromide Monohydrate (Aero Soln) Tiotropium Bromide Monohydrate 2.5 MCG/ACT Inhale 2 Inhalation by mouth every day.        Warfarin Sodium (Tab) COUMADIN 4 MG Take 4 mg by mouth every evening.        .                 Medicines prescribed today were sent to:     [facility]  [place], [place] - [address]    [facility] [place] [address]    Phone: [phone] Fax: [phone]    Open 24 Hours?: No      Medication refill instructions:       If your prescription bottle indicates you have medication refills left, it is not necessary to call your provider’s office. Please contact your pharmacy and they will refill your medication.    If your prescription bottle indicates you do not have any refills left, you may request refills at any time through one of the following ways: The online SNTMNT system (except Urgent Care), by calling your provider’s office, or by asking your pharmacy to contact your provider’s office with a refill request. Medication refills are processed only during regular business hours and may not be available until the next business day. Your provider may request additional information or to have a follow-up visit with you prior to refilling your medication.   *Please Note: Medication refills are assigned a new Rx number when refilled electronically. Your pharmacy may indicate that no  refills were authorized even though a new prescription for the same medication is available at the pharmacy. Please request the medicine by name with the pharmacy before contacting your provider for a refill.        Your To Do List     Future Labs/Procedures Complete By Expires    AMB MULTIPLE OXIMETRY  As directed 6/8/2018      Instructions    1) Continue autoCPAP at [id number]  2) Clean mask and supplies weekly and change them as insurance allows  3) Continue Spiriva Respimat and Atrovent  4) Dietary changes recommended  4) Vaccines: Up to date with Prevnar 13 and Pneumovax 23  5) Return in about 3 months (around 9/8/2017), or with JEANNETTE Neff or JEANNETTE Torres, for Compliance, review of symptoms, if not sooner.              App[employer] Access Code: Activation code not generated  Current App[employer] Status: Active

## 2017-06-08 NOTE — PATIENT INSTRUCTIONS
1) Continue autoCPAP at [id number]  2) Clean mask and supplies weekly and change them as insurance allows  3) Continue Spiriva Respimat and Atrovent  4) Dietary changes recommended  4) Vaccines: Up to date with Prevnar 13 and Pneumovax 23  5) Return in about 3 months (around 9/8/2017), or with JEANNETTE Neff or JEANNETTE Torres, for Compliance, review of symptoms, if not sooner.

## 2017-06-08 NOTE — PROGRESS NOTES
CC:  Here for f/u sleep and pulmonary issues as listed below    HPI:   Hal presents today for follow up for COPD, FREDDY, and hospital f/u.      Patient was hospitalized May 22 through May 23, 2017 at [facility] for COPD exacerbation. Patient had experienced increasing shortness of breath and wheezing. X-ray completed showed minimal right lung base atelectasis, no lobar pneumonia or pulmonary edema, stable cardiomegaly. Patient was provided steroids and antibiotic with light diuresis. He was discharged on oxygen with exertion.  Today patient feels like he is back to his baseline and even better. We completed a Multi-ox today on room air indicates his oxygen increases to 94% with exertion. He started at 90% while sitting. Patient's BMI is 35. He has been trying to change diet and reports he is quite active. PFTs from 4/2016 indicate a Fev1 of 1.92L or 61% predicted with a  Moderate bronchodilator response, Fev1/FVC ratio of 65, DLCO 104%. Patient is compliance using Spiriva Spiriva once a day and Atrovent 1-2 times every 2-3 days. He has tried Symbicort, pro-air and budesonide nebulized treatments in the past which caused headache. Patient is requesting nebulized treatments today for refill. He is amendable to trying DuoNeb again and reports that at higher elevations he gets headaches and that he has a history of chronic headaches so that it may in fact not be related to the medications he has used in the past. They will be moving to [facility] in September. He finds that he presents much better at lower elevations. He has no trouble with exertion or shortness of breath at lower elevations but in his own home and elevation of almost 5000 feet he has exertional dyspnea. He reports he has chest tightness that is mild in nature in the morning and very rare with exertion that has been occurring on and off for approximately one year. He has a residual dry cough with minor wheeze that is almost resolved per patient since he  was hospitalized.      PSG from 2014 indicated an AHI of 70.4 and low oxygen of 81%.  Currently he is being treated with autoCPAP @ 6-8cmH20 with 2L of oxygen.  Compliance download is not available but reports he uses it at least 6 hours every single night. He cannot sleep without it. He does nap approximately 5 times a week for 1-3 hours and uses his machine. He does tolerate pressure and mask well.  He wakes up refreshed and is less tired throughout the day than before treatment. He denies morning H/A and sleep better overall. He will continue to clean supplies weekly and change them as insurance allows.       Patient Active Problem List    Diagnosis Date Noted   • Essential hypertension 06/23/2015     Priority: Medium   • AS (aortic stenosis) of bioprosthetic valve and needs TAVR 06/14/2015     Priority: Medium   • Chronic combined systolic and diastolic heart failure (CMS-HCC) 04/16/2015     Priority: Medium   • CAD (coronary artery disease) 04/08/2015     Priority: Medium   • Dyslipidemia      Priority: Medium   • Atrial fibrillation (CMS-HCC)      Priority: Medium   • S/P aortic valve replacement      Priority: Medium   • FREDDY on CPAP      Priority: Medium   • COPD (chronic obstructive pulmonary disease) with chronic bronchitis (CMS-HCC) 04/15/2016     Priority: Low   • Hx of Hyperglycemia 04/09/2015     Priority: Low   • Anemia 04/09/2015     Priority: Low   • Chronic maxillary sinusitis 04/07/2015     Priority: Low   • Chronic anticoagulation 01/13/2015     Priority: Low   • GERD (gastroesophageal reflux disease) 09/11/2014     Priority: Low   • Gout      Priority: Low   • Chronic obstructive pulmonary disease (CMS-HCC) 06/08/2017   • FREDDY (obstructive sleep apnea) 06/08/2017   • CHF (congestive heart failure) (CMS-HCC) 05/22/2017       Past Medical History   Diagnosis Date   • Dyslipidemia    • Gout    • Atrial fibrillation (CMS-HCC)    • HTN (hypertension)    • S/P aortic valve replacement    • Indigestion     • Breath shortness    • Snoring    • Arthritis      gout, osteo hands, feet knees   • Bronchitis 12/2014   • Pneumonia 2/2015   • Asthma      uses inhalers   • FREDDY on CPAP      cpap   • Heart murmur    • AS (aortic stenosis) of bioprosthetic valve and needs TAVR 6/14/2015   • Hypertension    • GERD (gastroesophageal reflux disease)    • CHF (congestive heart failure) (CMS-HCC)    • Allergy    • COPD (chronic obstructive pulmonary disease) with chronic bronchitis (CMS-HCC) 4/15/2016       Past Surgical History   Procedure Laterality Date   • Aortic valve replacement  2004, 2015     bovine valve   • Septoplasty     • Colonoscopy  1/3/11     colonic ileo cecal valve prominent fold-prominent lymphoid tissue   • Ethmoidectomy  4/7/2015     Performed by Gopal Guzmán M.D. at [facility]   • Antrostomy  4/7/2015     Performed by Gopal Guzmán M.D. at [facility]   • Kyphoplasty  6/15       Family History   Problem Relation Age of Onset   • Heart Disease Mother      CAD s/p CABG   • Diabetes Mother    • Cancer Mother      breast   • Heart Attack Father 1943   • Stroke Father    • Diabetes Father      type 2   • Hypertension Sister    • Diabetes Sister      prediabetes   • Hyperlipidemia Sister        Social History     Social History   • Marital Status:      Spouse Name: N/A   • Number of Children: 1   • Years of Education: N/A     Occupational History   • retired ,       Social History Main Topics   • Smoking status: Former Smoker -- 1.00 packs/day for 15 years     Types: Cigarettes     Quit date: 01/01/1984   • Smokeless tobacco: Never Used      Comment: quit in 1984   • Alcohol Use: 1.2 oz/week     1 Glasses of wine, 1 Cans of beer per week      Comment: rarely   • Drug Use: No   • Sexual Activity:     Partners: Female     Other Topics Concern   • Not on file     Social History Narrative       Current Outpatient Prescriptions   Medication Sig  Dispense Refill   • ipratropium-albuterol (DUONEB) 0.5-2.5 (3) MG/3ML nebulizer solution 3 mL by Nebulization route every four hours as needed for Shortness of Breath (Wheezing). 120 Vial 0   • potassium chloride SA (KDUR) 20 MEQ Tab CR Take 1 Tab by mouth 2 times a day. 60 Tab 10   • guaifenesin DM (ROBITUSSIN DM) 100-10 MG/5ML Syrup syrup Take 10 mL by mouth every 6 hours as needed for Cough. 240 mL 0   • warfarin (COUMADIN) 4 MG Tab Take 4 mg by mouth every evening.     • omeprazole (PRILOSEC) 20 MG delayed-release capsule TAKE ONE CAPSULE BY MOUTH ONE TIME DAILY 90 Cap 1   • diltiazem (CARDIZEM) 90 MG Tab TAKE ONE TABLET BY MOUTH TWICE DAILY 180 Tab 1   • Tiotropium Bromide Monohydrate (SPIRIVA RESPIMAT) 2.5 MCG/ACT Aero Soln Inhale 2 Inhalation by mouth every day. 1 Inhaler 5   • furosemide (LASIX) 40 MG Tab TAKE ONE TABLET BY MOUTH ONE TIME DAILY 30 Tab 3   • atorvastatin (LIPITOR) 40 MG Tab TAKE 1 TABLET BY MOUTH ONCE DAILY IN THE EVENING. 90 Tab 1   • losartan (COZAAR) 100 MG Tab TAKE ONE TABLET BY MOUTH ONE TIME DAILY 90 Tab 1   • budesonide-formoterol (SYMBICORT) 80-4.5 MCG/ACT Aerosol Inhale 2 Puffs by mouth 2 Times a Day. Inhale 2 puffs by mouth twice daily with spacer. Rinse mouth after each use. 1 Inhaler 5   • ipratropium (ATROVENT HFA) 17 MCG/ACT Aero Soln Inhale 2 Puffs by mouth every 6 hours as needed (for shortness of breath and wheezing.). 1 Inhaler 5   • allopurinol (ZYLOPRIM) 300 MG Tab Take 0.5 Tabs by mouth every day. 45 Tab 1   • carvedilol (COREG) 25 MG Tab Take 1 Tab by mouth 2 times a day, with meals. 180 Tab 3   • dronedarone (MULTAQ) 400 MG Tab Take 400 mg by mouth 2 times a day, with meals.       No current facility-administered medications for this visit.          Allergies: Albuterol; Codeine; Diphenhydramine; Fluoxetine; Fluticasone-salmeterol; Olanzapine; Shellfish allergy; Simvastatin; Zyprexa; and Pcn      ROS   Gen: Denies fever, chills, unintentional weight loss, fatigue, night  "sweats  E/N/T: Denies ear pain, nasal congestion  Resp:Denies sputum production, hemoptysis  CV: Denies  chest tightness, palpitations, BLE edema  Sleep:Denies morning headache, insomnia, daytime somnolence, snoring, gasping for air, apnea  Neuro: Denies frequent headaches, weakness, dizziness  GI: Denies N/V, acid reflux/heartburn  See HPI.  All other systems reviewed and negative      Vital signs for this encounter:  Filed Vitals:    06/08/17 1514   Height: 1.803 m (5' 10.98\")   Weight: 114.306 kg (252 lb)   Weight % change since last entry.: 0 %   BP: 122/68   Pulse: 60   BMI (Calculated): 35.16   Resp: 16   Temp: 36.2 °C (97.2 °F)   O2 sat % room air: 93 %     Multi-Ox Readings  Multi Ox #1 Room air   O2 sat % at rest 94   O2 sat % on exertion 90   O2 sat average on exertion     Multi Ox #2     O2 sat % at rest     O2 sat % on exertion     O2 sat average on exertion       Oxygen Use 2   Oxygen Frequency Nocturnal   Duration of need     Is the patient mobile within the home?     CPAP Use? Auto 10-18 cm H20 122/68   BIPAP Use?     Servo Titration                     Physical Exam:   Appearance: well developed, well nourished, no acute distress.  Eyes: PERRL, EOM intact, sclere white, conjunctiva moist.  Ears: no lesions or deformities.  Hearing: grossly intact.  Nose: no lesions or deformities.  Dentition: good dentition.  Oropharynx: tongue normal, posterior pharynx without erythema or exudate.  Neck: supple, trachea midline, no masses.  Respiratory effort: no intercostal retractions or use of accessory muscles.  Lung auscultation: Bilateral diminished   Heart auscultation: no murmur, rub, or gallop.   Extremities: no cyanosis or edema.  Abdomen: soft, non-tender, no masses.  Gait and station: grossly normal   Digits and Nails: no clubbing, cyanosis, petechiae, or nodes.  Cranial nerves: grossly normal.  Motor: no focal deficits observed.  Skin: no rashes, lesions, or ulcers noted.  Orientation: oriented to time, " place, and person.  Mood and affect: mood and affect appropriate, normal interaction with examiner.    Assessment   1. Chronic obstructive pulmonary disease, unspecified COPD type (CMS-HCC)  ipratropium-albuterol (DUONEB) 0.5-2.5 (3) MG/3ML nebulizer solution    AMB MULTIPLE OXIMETRY    AMB MULTIPLE OXIMETRY    DME OTHER   2. FREDDY (obstructive sleep apnea)         Patient was seen for 25 minutes, more than 50% of time spent in face to face review, counseling, and arranging future evaluation and follow up of medical conditions and care.     PLAN:   Patient Instructions   1) Continue autoCPAP at [id number] with 2L of oxygen  2) Clean mask and supplies weekly and change them as insurance allows  3) Continue Spiriva Respimat and Atrovent  4) Dietary changes recommended  4) Vaccines: Up to date with Prevnar 13 and Pneumovax 23  5) Return in about 3 months (around 9/8/2017), or with JEANNETTE Neff or JEANNETTE Torres, for Compliance, review of symptoms, if not sooner.  6) D/C order for daytime oxygen. Keep concentrator for nocturnal use

## 2017-06-08 NOTE — PROCEDURES
Multi-Ox Readings  Multi Ox #1 Room air   O2 sat % at rest 94   O2 sat % on exertion 90   O2 sat average on exertion     Multi Ox #2     O2 sat % at rest     O2 sat % on exertion     O2 sat average on exertion       Oxygen Use 2   Oxygen Frequency Nocturnal   Duration of need     Is the patient mobile within the home?     CPAP Use? Auto 10-18 cm H20 122/68   BIPAP Use?     Servo Titration

## 2017-06-08 NOTE — PROGRESS NOTES
CC:  Here for f/u pulmonary issues as listed below    HPI:   This is a 71 y.o. male with a history of chronic obstructive pulmonary disease.  Pulmonary function tests from 4/8/16 indicates FEV1 1.92 L, 61% predicted, FEV1/FVC 65%, TLC 84% predicted, and DLCO 104% predicted. Currently the patient is using Atrovent approximately 3-4 times per week. He has discontinued Symbicort, pro-air, and budesonide nebulized treatments due to the causing headache or memory lapse. It seems as though he is fairly intolerant to albuterol so will start him on Spiriva Respimat 2 inhalations daily.    Polysomnogram indicates AHI 70.4 and minimum saturation 82%. The patient is compliant with APAP 10-18 cm with 2 L of oxygen bleed in. Patient reports using his she nightly for 7 or more hours each night. He has no problems tolerating the mask or pressure. He is well rested when he wakes up.      Hal presents today for follow up for ***. PFTs from *** indicate a Fev1 of ***L or ***% predicted with a *** bronchodilator response, Fev1/FVC ratio of ***, DLCO ***%.        Patient Active Problem List    Diagnosis Date Noted   • Essential hypertension 06/23/2015     Priority: Medium   • AS (aortic stenosis) of bioprosthetic valve and needs TAVR 06/14/2015     Priority: Medium   • Chronic combined systolic and diastolic heart failure (CMS-HCC) 04/16/2015     Priority: Medium   • CAD (coronary artery disease) 04/08/2015     Priority: Medium   • Dyslipidemia      Priority: Medium   • Atrial fibrillation (CMS-HCC)      Priority: Medium   • S/P aortic valve replacement      Priority: Medium   • FREDDY on CPAP      Priority: Medium   • COPD (chronic obstructive pulmonary disease) with chronic bronchitis (CMS-HCC) 04/15/2016     Priority: Low   • Hx of Hyperglycemia 04/09/2015     Priority: Low   • Anemia 04/09/2015     Priority: Low   • Chronic maxillary sinusitis 04/07/2015     Priority: Low   • Chronic anticoagulation 01/13/2015     Priority: Low    • GERD (gastroesophageal reflux disease) 09/11/2014     Priority: Low   • Gout      Priority: Low   • CHF (congestive heart failure) (CMS-HCC) 05/22/2017   • COPD with exacerbation (CMS-HCC) 05/22/2017   • Obesity (BMI 30-39.9) 04/07/2017       Past Medical History   Diagnosis Date   • Dyslipidemia    • Gout    • Atrial fibrillation (CMS-HCC)    • HTN (hypertension)    • S/P aortic valve replacement    • Indigestion    • Breath shortness    • Snoring    • Arthritis      gout, osteo hands, feet knees   • Bronchitis 12/2014   • Pneumonia 2/2015   • Asthma      uses inhalers   • FREDDY on CPAP      cpap   • Heart murmur    • AS (aortic stenosis) of bioprosthetic valve and needs TAVR 6/14/2015   • Hypertension    • GERD (gastroesophageal reflux disease)    • CHF (congestive heart failure) (CMS-HCC)    • Allergy    • COPD (chronic obstructive pulmonary disease) with chronic bronchitis (CMS-HCC) 4/15/2016       Past Surgical History   Procedure Laterality Date   • Aortic valve replacement  2004, 2015     bovine valve   • Septoplasty     • Colonoscopy  1/3/11     colonic ileo cecal valve prominent fold-prominent lymphoid tissue   • Ethmoidectomy  4/7/2015     Performed by Gopal Guzmán M.D. at [facility]   • Antrostomy  4/7/2015     Performed by Gopal Guzmán M.D. at [facility]   • Kyphoplasty  6/15       Family History   Problem Relation Age of Onset   • Heart Disease Mother      CAD s/p CABG   • Diabetes Mother    • Cancer Mother      breast   • Heart Attack Father 1943   • Stroke Father    • Diabetes Father      type 2   • Hypertension Sister    • Diabetes Sister      prediabetes   • Hyperlipidemia Sister        Social History   Substance Use Topics   • Smoking status: Former Smoker -- 1.00 packs/day for 15 years     Types: Cigarettes     Quit date: 01/01/1984   • Smokeless tobacco: Never Used      Comment: quit in 1984   • Alcohol Use: 1.2 oz/week     1 Glasses of wine, 1 Cans of beer  per week      Comment: rarely       Current Outpatient Prescriptions   Medication Sig Dispense Refill   • potassium chloride SA (KDUR) 20 MEQ Tab CR Take 1 Tab by mouth 2 times a day. 60 Tab 10   • guaifenesin DM (ROBITUSSIN DM) 100-10 MG/5ML Syrup syrup Take 10 mL by mouth every 6 hours as needed for Cough. 240 mL 0   • warfarin (COUMADIN) 4 MG Tab Take 4 mg by mouth every evening.     • omeprazole (PRILOSEC) 20 MG delayed-release capsule TAKE ONE CAPSULE BY MOUTH ONE TIME DAILY 90 Cap 1   • diltiazem (CARDIZEM) 90 MG Tab TAKE ONE TABLET BY MOUTH TWICE DAILY 180 Tab 1   • Tiotropium Bromide Monohydrate (SPIRIVA RESPIMAT) 2.5 MCG/ACT Aero Soln Inhale 2 Inhalation by mouth every day. 1 Inhaler 5   • furosemide (LASIX) 40 MG Tab TAKE ONE TABLET BY MOUTH ONE TIME DAILY 30 Tab 3   • atorvastatin (LIPITOR) 40 MG Tab TAKE 1 TABLET BY MOUTH ONCE DAILY IN THE EVENING. 90 Tab 1   • losartan (COZAAR) 100 MG Tab TAKE ONE TABLET BY MOUTH ONE TIME DAILY 90 Tab 1   • budesonide-formoterol (SYMBICORT) 80-4.5 MCG/ACT Aerosol Inhale 2 Puffs by mouth 2 Times a Day. Inhale 2 puffs by mouth twice daily with spacer. Rinse mouth after each use. 1 Inhaler 5   • ipratropium (ATROVENT HFA) 17 MCG/ACT Aero Soln Inhale 2 Puffs by mouth every 6 hours as needed (for shortness of breath and wheezing.). 1 Inhaler 5   • allopurinol (ZYLOPRIM) 300 MG Tab Take 0.5 Tabs by mouth every day. 45 Tab 1   • carvedilol (COREG) 25 MG Tab Take 1 Tab by mouth 2 times a day, with meals. 180 Tab 3   • dronedarone (MULTAQ) 400 MG Tab Take 400 mg by mouth 2 times a day, with meals.       No current facility-administered medications for this visit.          Allergies: Albuterol; Codeine; Diphenhydramine; Fluoxetine; Fluticasone-salmeterol; Olanzapine; Shellfish allergy; Simvastatin; Zyprexa; and Pcn          ROS ***  Gen: Denies fever, chills, unintentional weight loss, fatigue, night sweats  E/N/T: Denies nasal congestion, ear pain  Resp:Denies Dyspnea, wheezing,  "cough, sputum production, hemoptysis  CV: Denies chest pain, chest tightness, palpitations  Sleep:Denies morning headache  Neuro: Denies frequent headaches, weakness, dizziness  GI: Denies acid reflux, N/V  See HPI.  All other systems reviewed and negative          Vital signs for this encounter:  Filed Vitals:    06/08/17 1514   Height: 1.803 m (5' 10.98\")   Weight: 114.306 kg (252 lb)   Weight % change since last entry.: 0 %   BP: 122/68   Pulse: 60   BMI (Calculated): 35.16   Resp: 16   Temp: 36.2 °C (97.2 °F)   O2 sat % room air: 93 %                 Physical Exam:   Appearance: well developed, well nourished, no acute distress. ***  Eyes: PERRL, EOM intact, sclere white, conjunctiva moist.  Ears: no lesions or deformities.  Hearing: grossly intact.  Nose: no lesions or deformities.  Dentition: good dentition.   Oropharynx: tongue normal, posterior pharynx without erythema or exudate.  Neck: supple, trachea midline, no masses.  Respiratory effort: no intercostal retractions or use of accessory muscles.  Lung auscultation: Bilateral diminished ***  Heart auscultation: no murmur, rub, or gallop ***  Extremities: no cyanosis or edema.  Abdomen: soft, non-tender, no masses.  Gait and station: without difficulty ***  Digits and Nails: no clubbing, cyanosis, petechiae, or nodes.  Cranial nerves: grossly normal.  Motor: no focal deficits observed.   Skin: no rashes, lesions, or ulcers noted.  Orientation: oriented to time, place, and person.  Mood and affect: mood and affect appropriate, normal interaction with examiner.      Assessment   No diagnosis found.    Patient was seen for *** minutes, more than 50% of time spent in face to face review, counseling, and arranging future evaluation and follow up of medical conditions and care.     PLAN:   ***      "

## 2017-06-19 ENCOUNTER — HOSPITAL ENCOUNTER (OUTPATIENT)
Dept: LAB | Facility: MEDICAL CENTER | Age: 72
End: 2017-06-19
Attending: FAMILY MEDICINE
Payer: MEDICARE

## 2017-06-19 ENCOUNTER — TELEPHONE (OUTPATIENT)
Dept: MEDICAL GROUP | Facility: PHYSICIAN GROUP | Age: 72
End: 2017-06-19

## 2017-06-19 DIAGNOSIS — I48.0 PAROXYSMAL ATRIAL FIBRILLATION (HCC): ICD-10-CM

## 2017-06-19 LAB
INR PPP: 2.53 (ref 0.87–1.13)
PROTHROMBIN TIME: 28 SEC (ref 12–14.6)

## 2017-06-19 PROCEDURE — 36415 COLL VENOUS BLD VENIPUNCTURE: CPT

## 2017-06-19 PROCEDURE — 85610 PROTHROMBIN TIME: CPT

## 2017-06-20 NOTE — TELEPHONE ENCOUNTER
----- Message from Sada Mata M.D. sent at 6/19/2017  2:52 PM PDT -----  Please inform patient of INR  2.53   , continue current dosage of Coumadin and monitor INR in 4 weeks.

## 2017-06-23 ENCOUNTER — TELEPHONE (OUTPATIENT)
Dept: PULMONOLOGY | Facility: HOSPICE | Age: 72
End: 2017-06-23

## 2017-06-23 DIAGNOSIS — J44.9 CHRONIC OBSTRUCTIVE PULMONARY DISEASE, UNSPECIFIED COPD TYPE (HCC): ICD-10-CM

## 2017-06-23 RX ORDER — ALBUTEROL SULFATE 90 UG/1
2 AEROSOL, METERED RESPIRATORY (INHALATION) EVERY 6 HOURS PRN
COMMUNITY
End: 2017-06-23 | Stop reason: SDUPTHER

## 2017-06-23 RX ORDER — ALBUTEROL SULFATE 90 UG/1
2 AEROSOL, METERED RESPIRATORY (INHALATION) EVERY 6 HOURS PRN
Qty: 8.5 G | Refills: 2 | Status: SHIPPED | OUTPATIENT
Start: 2017-06-23

## 2017-06-23 NOTE — TELEPHONE ENCOUNTER
I was able to change the allergy on the pt's chart to reflect HA with Albuterol and the pt's wife Renetta was notified that rx was sent to pharmacy.

## 2017-06-23 NOTE — TELEPHONE ENCOUNTER
Please verify patient's allergies. Please verify that she's had albuterol before with no adverse reaction.

## 2017-07-05 ENCOUNTER — HOSPITAL ENCOUNTER (OUTPATIENT)
Dept: LAB | Facility: MEDICAL CENTER | Age: 72
End: 2017-07-05
Attending: PLASTIC SURGERY
Payer: MEDICARE

## 2017-07-05 PROCEDURE — 36415 COLL VENOUS BLD VENIPUNCTURE: CPT

## 2017-07-05 PROCEDURE — 84153 ASSAY OF PSA TOTAL: CPT

## 2017-07-05 PROCEDURE — 84154 ASSAY OF PSA FREE: CPT

## 2017-07-07 ENCOUNTER — TELEPHONE (OUTPATIENT)
Dept: CARDIOLOGY | Facility: MEDICAL CENTER | Age: 72
End: 2017-07-07

## 2017-07-07 LAB
PSA FREE MFR SERPL: 27 %
PSA FREE SERPL-MCNC: 1.4 NG/ML
PSA SERPL-MCNC: 5.2 NG/ML (ref 0–4)

## 2017-07-07 NOTE — PROGRESS NOTES
Quick Note:    Dear Calli,    Can you please let Hal Oro Elijah know that result is not entirely normal with high PSA and he should follow with primary care MD for further care?    Thanks Conrad Mccurdy.    ______

## 2017-07-07 NOTE — TELEPHONE ENCOUNTER
"Called pt per TT request re: PSA result. Pts spouse states he is doing his nebulizer and could I talk to her. I noticed Dr. Gregory had ordered, so asked pts wife if they have an appt to FU and she confirms they have a 7/12 11:30 appt with Dr. Nova Thomas. We discuss result and she states \"Oh it's been higher\". We confirm next cardiology appt with SC while on phone.  "

## 2017-07-21 RX ORDER — ATORVASTATIN CALCIUM 40 MG/1
TABLET, FILM COATED ORAL
Qty: 90 TAB | Refills: 1 | Status: SHIPPED | OUTPATIENT
Start: 2017-07-21

## 2017-07-21 RX ORDER — LOSARTAN POTASSIUM 100 MG/1
TABLET ORAL
Qty: 90 TAB | Refills: 1 | Status: SHIPPED | OUTPATIENT
Start: 2017-07-21

## 2017-07-21 RX ORDER — WARFARIN SODIUM 4 MG/1
4 TABLET ORAL EVERY EVENING
Qty: 90 TAB | Refills: 1 | Status: SHIPPED | OUTPATIENT
Start: 2017-07-21

## 2017-08-07 ENCOUNTER — RX ONLY (OUTPATIENT)
Age: 72
Setting detail: RX ONLY
End: 2017-08-07

## 2017-08-08 ENCOUNTER — OFFICE VISIT (OUTPATIENT)
Dept: CARDIOLOGY | Facility: MEDICAL CENTER | Age: 72
End: 2017-08-08
Payer: MEDICARE

## 2017-08-08 ENCOUNTER — HOSPITAL ENCOUNTER (OUTPATIENT)
Dept: LAB | Facility: MEDICAL CENTER | Age: 72
End: 2017-08-08
Attending: FAMILY MEDICINE
Payer: MEDICARE

## 2017-08-08 VITALS
OXYGEN SATURATION: 91 % | BODY MASS INDEX: 37.62 KG/M2 | SYSTOLIC BLOOD PRESSURE: 110 MMHG | WEIGHT: 254 LBS | HEART RATE: 64 BPM | DIASTOLIC BLOOD PRESSURE: 60 MMHG | HEIGHT: 69 IN

## 2017-08-08 DIAGNOSIS — I10 ESSENTIAL HYPERTENSION: Chronic | ICD-10-CM

## 2017-08-08 DIAGNOSIS — I48.20 CHRONIC ATRIAL FIBRILLATION (HCC): Chronic | ICD-10-CM

## 2017-08-08 DIAGNOSIS — I25.10 CORONARY ARTERY DISEASE INVOLVING NATIVE HEART WITHOUT ANGINA PECTORIS, UNSPECIFIED VESSEL OR LESION TYPE: Chronic | ICD-10-CM

## 2017-08-08 DIAGNOSIS — Z95.2 S/P AORTIC VALVE REPLACEMENT: Chronic | ICD-10-CM

## 2017-08-08 DIAGNOSIS — E78.5 DYSLIPIDEMIA: ICD-10-CM

## 2017-08-08 DIAGNOSIS — E78.5 DYSLIPIDEMIA: Chronic | ICD-10-CM

## 2017-08-08 DIAGNOSIS — I50.42 CHRONIC COMBINED SYSTOLIC AND DIASTOLIC HEART FAILURE (HCC): Chronic | ICD-10-CM

## 2017-08-08 DIAGNOSIS — Z12.5 SCREENING FOR PROSTATE CANCER: ICD-10-CM

## 2017-08-08 DIAGNOSIS — I48.0 PAF (PAROXYSMAL ATRIAL FIBRILLATION) (HCC): ICD-10-CM

## 2017-08-08 DIAGNOSIS — J44.9 CHRONIC OBSTRUCTIVE PULMONARY DISEASE, UNSPECIFIED COPD TYPE (HCC): ICD-10-CM

## 2017-08-08 DIAGNOSIS — G47.33 OSA ON CPAP: ICD-10-CM

## 2017-08-08 DIAGNOSIS — I10 ESSENTIAL HYPERTENSION: ICD-10-CM

## 2017-08-08 DIAGNOSIS — R73.01 IMPAIRED FASTING BLOOD SUGAR: ICD-10-CM

## 2017-08-08 DIAGNOSIS — G47.33 OSA ON CPAP: Chronic | ICD-10-CM

## 2017-08-08 DIAGNOSIS — I35.0 AORTIC VALVE STENOSIS, UNSPECIFIED ETIOLOGY: Chronic | ICD-10-CM

## 2017-08-08 DIAGNOSIS — I50.42 CHRONIC COMBINED SYSTOLIC AND DIASTOLIC HEART FAILURE (HCC): ICD-10-CM

## 2017-08-08 PROBLEM — I50.9 CHF (CONGESTIVE HEART FAILURE) (HCC): Status: RESOLVED | Noted: 2017-05-22 | Resolved: 2017-08-08

## 2017-08-08 LAB
ALBUMIN SERPL BCP-MCNC: 3.5 G/DL (ref 3.2–4.9)
ALBUMIN/GLOB SERPL: 1.2 G/DL
ALP SERPL-CCNC: 82 U/L (ref 30–99)
ALT SERPL-CCNC: 13 U/L (ref 2–50)
ANION GAP SERPL CALC-SCNC: 6 MMOL/L (ref 0–11.9)
AST SERPL-CCNC: 16 U/L (ref 12–45)
BASOPHILS # BLD AUTO: 0.1 % (ref 0–1.8)
BASOPHILS # BLD: 0.01 K/UL (ref 0–0.12)
BILIRUB SERPL-MCNC: 0.7 MG/DL (ref 0.1–1.5)
BUN SERPL-MCNC: 17 MG/DL (ref 8–22)
CALCIUM SERPL-MCNC: 9.3 MG/DL (ref 8.5–10.5)
CHLORIDE SERPL-SCNC: 106 MMOL/L (ref 96–112)
CHOLEST SERPL-MCNC: 147 MG/DL (ref 100–199)
CO2 SERPL-SCNC: 27 MMOL/L (ref 20–33)
CREAT SERPL-MCNC: 0.89 MG/DL (ref 0.5–1.4)
EOSINOPHIL # BLD AUTO: 0.37 K/UL (ref 0–0.51)
EOSINOPHIL NFR BLD: 4.1 % (ref 0–6.9)
ERYTHROCYTE [DISTWIDTH] IN BLOOD BY AUTOMATED COUNT: 47.6 FL (ref 35.9–50)
EST. AVERAGE GLUCOSE BLD GHB EST-MCNC: 140 MG/DL
GFR SERPL CREATININE-BSD FRML MDRD: >60 ML/MIN/1.73 M 2
GLOBULIN SER CALC-MCNC: 2.9 G/DL (ref 1.9–3.5)
GLUCOSE SERPL-MCNC: 105 MG/DL (ref 65–99)
HBA1C MFR BLD: 6.5 % (ref 0–5.6)
HCT VFR BLD AUTO: 43.3 % (ref 42–52)
HDLC SERPL-MCNC: 32 MG/DL
HGB BLD-MCNC: 13.9 G/DL (ref 14–18)
IMM GRANULOCYTES # BLD AUTO: 0.03 K/UL (ref 0–0.11)
IMM GRANULOCYTES NFR BLD AUTO: 0.3 % (ref 0–0.9)
INR PPP: 2.72 (ref 0.87–1.13)
LDLC SERPL CALC-MCNC: 87 MG/DL
LYMPHOCYTES # BLD AUTO: 1.7 K/UL (ref 1–4.8)
LYMPHOCYTES NFR BLD: 18.7 % (ref 22–41)
MCH RBC QN AUTO: 28.5 PG (ref 27–33)
MCHC RBC AUTO-ENTMCNC: 32.1 G/DL (ref 33.7–35.3)
MCV RBC AUTO: 88.9 FL (ref 81.4–97.8)
MONOCYTES # BLD AUTO: 0.7 K/UL (ref 0–0.85)
MONOCYTES NFR BLD AUTO: 7.7 % (ref 0–13.4)
NEUTROPHILS # BLD AUTO: 6.27 K/UL (ref 1.82–7.42)
NEUTROPHILS NFR BLD: 69.1 % (ref 44–72)
NRBC # BLD AUTO: 0 K/UL
NRBC BLD AUTO-RTO: 0 /100 WBC
PLATELET # BLD AUTO: 183 K/UL (ref 164–446)
PMV BLD AUTO: 9.9 FL (ref 9–12.9)
POTASSIUM SERPL-SCNC: 3.8 MMOL/L (ref 3.6–5.5)
PROT SERPL-MCNC: 6.4 G/DL (ref 6–8.2)
PROTHROMBIN TIME: 29.7 SEC (ref 12–14.6)
PSA SERPL-MCNC: 4.5 NG/ML (ref 0–4)
RBC # BLD AUTO: 4.87 M/UL (ref 4.7–6.1)
SODIUM SERPL-SCNC: 139 MMOL/L (ref 135–145)
TRIGL SERPL-MCNC: 139 MG/DL (ref 0–149)
WBC # BLD AUTO: 9.1 K/UL (ref 4.8–10.8)

## 2017-08-08 PROCEDURE — 80061 LIPID PANEL: CPT

## 2017-08-08 PROCEDURE — 84153 ASSAY OF PSA TOTAL: CPT | Mod: GZ

## 2017-08-08 PROCEDURE — 85025 COMPLETE CBC W/AUTO DIFF WBC: CPT

## 2017-08-08 PROCEDURE — 36415 COLL VENOUS BLD VENIPUNCTURE: CPT

## 2017-08-08 PROCEDURE — 80053 COMPREHEN METABOLIC PANEL: CPT

## 2017-08-08 PROCEDURE — 83036 HEMOGLOBIN GLYCOSYLATED A1C: CPT | Mod: GZ

## 2017-08-08 PROCEDURE — 99214 OFFICE O/P EST MOD 30 MIN: CPT | Performed by: NURSE PRACTITIONER

## 2017-08-08 PROCEDURE — 85610 PROTHROMBIN TIME: CPT

## 2017-08-08 ASSESSMENT — ENCOUNTER SYMPTOMS
CLAUDICATION: 0
PND: 0
SPUTUM PRODUCTION: 0
COUGH: 0
FEVER: 0
PALPITATIONS: 0
SHORTNESS OF BREATH: 1
ABDOMINAL PAIN: 0
DIZZINESS: 0
MYALGIAS: 0
WHEEZING: 1
WEAKNESS: 0
ORTHOPNEA: 0

## 2017-08-08 NOTE — Clinical Note
[facility]   [address] [place] [address]  [place] [address]  Phone: [phone]  Fax: [phone]              Hal Nava  1945    Encounter Date: 8/8/2017    MARIOLA Delacruz          PROGRESS NOTE:  Subjective:   Hal Nava is a 71 y.o. male who presents today for follow up before moving to [place].    He is a patient of Dr. Fofana in our office. Hx of HLD, paroxysmal afib, COPD, FREDDY (CPAP and O2), CAD (50% stenosis in OM), previous AVR and TAVR in '15, HTN, and obesity.    He is feeling great. He has no issues and is excited to be moving to [place] soon. He already has his doctor apt's lined up for there as well.    He has had no episodes of chest pain, palpitations, dizziness/lightheadedness, orthopnea, or peripheral edema.    Past Medical History   Diagnosis Date   • Dyslipidemia    • Gout    • Atrial fibrillation (CMS-HCC)    • Indigestion    • Breath shortness    • Arthritis      gout, osteo hands, feet knees   • Bronchitis 12/2014   • Asthma      uses inhalers   • Heart murmur    • Hypertension    • GERD (gastroesophageal reflux disease)    • CHF (congestive heart failure) (CMS-HCC)    • Allergy    • Sleep apnea    • Pneumonia 2/2015   • COPD (chronic obstructive pulmonary disease) (CMS-HCC)      Past Surgical History   Procedure Laterality Date   • Aortic valve replacement  2004, 2015     bovine valve   • Septoplasty     • Colonoscopy  1/3/11     colonic ileo cecal valve prominent fold-prominent lymphoid tissue   • Ethmoidectomy  4/7/2015     Performed by Gopal Guzmán M.D. at SURGERY SAME DAY [facility] ORS   • Antrostomy  4/7/2015     Performed by Gopal Guzmán M.D. at SURGERY SAME DAY [facility] ORS   • Kyphoplasty  6/15   • Aortic valve replacement       TAVR     Family History   Problem Relation Age of Onset   • Heart Disease Mother      CAD s/p CABG   • Diabetes Mother    • Cancer Mother      breast   • Heart Attack Father 1943   •  Stroke Father    • Diabetes Father      type 2   • Hypertension Sister    • Diabetes Sister      prediabetes   • Hyperlipidemia Sister      History   Smoking status   • Former Smoker -- 1.00 packs/day for 15 years   • Types: Cigarettes   • Quit date: 01/01/1984   Smokeless tobacco   • Never Used     Comment: quit in 1984     Allergies   Allergen Reactions   • Codeine      Constipation   • Diphenhydramine Unspecified     PER NEUROLOGIST   • Fluoxetine      Excessive sedation   • Fluticasone-Salmeterol      Headache   • Olanzapine Unspecified     SEDATION   • Shellfish Allergy Anaphylaxis   • Simvastatin      myalgia-per pt.   • Zyprexa      Excessive sedation   • Pcn [Penicillins] Hives and Rash     2003-12-04;rash  Per patient  Tolerates rocephin     Outpatient Encounter Prescriptions as of 8/8/2017   Medication Sig Dispense Refill   • Sildenafil Citrate (VIAGRA PO) Take  by mouth.     • warfarin (COUMADIN) 4 MG Tab Take 1 Tab by mouth every evening. 90 Tab 1   • losartan (COZAAR) 100 MG Tab TAKE ONE TABLET BY MOUTH ONE TIME DAILY 90 Tab 1   • atorvastatin (LIPITOR) 40 MG Tab TAKE 1 TABLET BY MOUTH ONCE DAILY IN THE EVENING. 90 Tab 1   • albuterol (PROAIR HFA) 108 (90 BASE) MCG/ACT Aero Soln inhalation aerosol Inhale 2 Puffs by mouth every 6 hours as needed for Shortness of Breath. 8.5 g 2   • ipratropium-albuterol (DUONEB) 0.5-2.5 (3) MG/3ML nebulizer solution 3 mL by Nebulization route every four hours as needed for Shortness of Breath (Wheezing). 120 Vial 0   • potassium chloride SA (KDUR) 20 MEQ Tab CR Take 1 Tab by mouth 2 times a day. 60 Tab 10   • guaifenesin DM (ROBITUSSIN DM) 100-10 MG/5ML Syrup syrup Take 10 mL by mouth every 6 hours as needed for Cough. 240 mL 0   • omeprazole (PRILOSEC) 20 MG delayed-release capsule TAKE ONE CAPSULE BY MOUTH ONE TIME DAILY 90 Cap 1   • diltiazem (CARDIZEM) 90 MG Tab TAKE ONE TABLET BY MOUTH TWICE DAILY 180 Tab 1   • Tiotropium Bromide Monohydrate (SPIRIVA RESPIMAT) 2.5  "MCG/ACT Aero Soln Inhale 2 Inhalation by mouth every day. 1 Inhaler 5   • furosemide (LASIX) 40 MG Tab TAKE ONE TABLET BY MOUTH ONE TIME DAILY 30 Tab 3   • ipratropium (ATROVENT HFA) 17 MCG/ACT Aero Soln Inhale 2 Puffs by mouth every 6 hours as needed (for shortness of breath and wheezing.). 1 Inhaler 5   • allopurinol (ZYLOPRIM) 300 MG Tab Take 0.5 Tabs by mouth every day. 45 Tab 1   • carvedilol (COREG) 25 MG Tab Take 1 Tab by mouth 2 times a day, with meals. 180 Tab 3   • dronedarone (MULTAQ) 400 MG Tab Take 400 mg by mouth 2 times a day, with meals.     • budesonide-formoterol (SYMBICORT) 80-4.5 MCG/ACT Aerosol Inhale 2 Puffs by mouth 2 Times a Day. Inhale 2 puffs by mouth twice daily with spacer. Rinse mouth after each use. (Patient not taking: Reported on 8/8/2017) 1 Inhaler 5     No facility-administered encounter medications on file as of 8/8/2017.     Review of Systems   Constitutional: Negative for fever and malaise/fatigue.   Respiratory: Positive for shortness of breath and wheezing. Negative for cough and sputum production.    Cardiovascular: Negative for chest pain, palpitations, orthopnea, claudication, leg swelling and PND.   Gastrointestinal: Negative for abdominal pain.   Musculoskeletal: Negative for myalgias.   Neurological: Negative for dizziness and weakness.   All other systems reviewed and are negative.       Objective:   /60 mmHg  Pulse 64  Ht 1.765 m (5' 9.49\")  Wt 115.214 kg (254 lb)  BMI 36.98 kg/m2  SpO2 91%    Physical Exam   Constitutional: He is oriented to person, place, and time. He appears well-developed. No distress.   obese   HENT:   Head: Normocephalic and atraumatic.   Eyes: EOM are normal.   Neck: Normal range of motion. No JVD present.   Cardiovascular: Normal rate, normal heart sounds, intact distal pulses and normal pulses.  An irregularly irregular rhythm present.   No murmur heard.  Pulmonary/Chest: Accessory muscle usage present. No respiratory distress. He " has decreased breath sounds in the right lower field and the left lower field. He has wheezes in the right upper field and the left upper field. He has rales in the right upper field and the left upper field.   Abdominal: Soft. Bowel sounds are normal.   Musculoskeletal: Normal range of motion. He exhibits no edema.   Neurological: He is alert and oriented to person, place, and time.   Skin: Skin is warm and dry.   Psychiatric: He has a normal mood and affect.   Nursing note and vitals reviewed.    Lab Results   Component Value Date/Time    CHOLESTEROL, 05/23/2017 03:47 AM    LDL 85 05/23/2017 03:47 AM    HDL 37* 05/23/2017 03:47 AM    TRIGLYCERIDES 60 05/23/2017 03:47 AM       Lab Results   Component Value Date/Time    SODIUM 140 05/23/2017 03:47 AM    POTASSIUM 4.0 05/23/2017 03:47 AM    CHLORIDE 104 05/23/2017 03:47 AM    CO2 26 05/23/2017 03:47 AM    GLUCOSE 196* 05/23/2017 03:47 AM    BUN 18 05/23/2017 03:47 AM    CREATININE 0.98 05/23/2017 03:47 AM     Lab Results   Component Value Date/Time    ALKALINE PHOSPHATASE 98 05/23/2017 03:47 AM    AST(SGOT) 18 05/23/2017 03:47 AM    ALT(SGPT) 11 05/23/2017 03:47 AM    TOTAL BILIRUBIN 0.4 05/23/2017 03:47 AM      2017 HOLTER MONITOR RESULTS  OCCASIONAL PREMATURE VENTRICULAR COMPLEX   NO SIGNIFICANT AV BLOCK   NO SINUS PAUSE     2017 ECHO CONCLUSIONS  Prior echocardiogram 7/24/2015.  Known bioprosthetic aortic valve that is functioning normally with   normal transvalvular gradients.Transvalvular gradients are - Peak: 34   mmHg, Mean: 17 mmHg. Dimensionless index is (0.42). No aortic   insufficiency.  Normal left ventricular systolic function.     Assessment:     1. Dyslipidemia     2. Chronic atrial fibrillation (CMS-HCC)     3. S/P aortic valve replacement     4. FREDDY on CPAP     5. Coronary artery disease involving native heart without angina pectoris, unspecified vessel or lesion type     6. Chronic combined systolic and diastolic heart failure (CMS-HCC)        7. Aortic valve stenosis, unspecified etiology     8. Essential hypertension       Medical Decision Making:  Today's Assessment / Status / Plan:     1. CAD (50% OM residual), no angina, GALINDO with COPD. Continue statin, coreg. No ASA with coumadin.    2. Previous AVR and more recently re-do with TAVR in '15, echo shows normal gradients. Normal EF. Follow with bi-yearly echo or unless warranted.    3. Chronic afib, rate controlled. Asymptomatic. Coreg, diltiazem, and coumadin. Coumadin per [facility]-therapeutic.     4. HLD, statin. LDL goal <70 with CAD. Good labs this year.    5. HTN, great control with losartan, coreg, diltiazem, and lasix.     6. FREDDY with CPAP and O2, managed by pulmonary.    7. HF, no signs of active HF. Follow clinically.    8. COPD, managed by pulmonary.     FU in [place] with new cardiologist; echo as warranted.    Patient verbalizes understanding and agrees with the plan of care.     Collaborating MD: Shanna RASHEED            No Recipients

## 2017-08-08 NOTE — MR AVS SNAPSHOT
"        Hal Nava   2017 9:40 AM   Office Visit   MRN: [id number]    Department:  [facility]   Dept Phone:  [phone]    Description:  Male : 1945   Provider:  MARIOLA Delacruz           Reason for Visit     Follow-Up           Allergies as of 2017     Allergen Noted Reactions    Codeine 2014       Constipation    Diphenhydramine 2017   Unspecified    PER NEUROLOGIST    Fluoxetine 2017       Excessive sedation    Fluticasone-Salmeterol 2017       Headache    Olanzapine 2017   Unspecified    SEDATION    Shellfish Allergy 2015   Anaphylaxis    Simvastatin 10/19/2016       myalgia-per pt.    Zyprexa 2014       Excessive sedation    Pcn [Penicillins] 2014   Hives, Rash    2003;rash  Per patient  Tolerates rocephin      Vital Signs     Blood Pressure Pulse Height Weight Body Mass Index Oxygen Saturation    110/60 mmHg 64 1.765 m (5' 9.49\") 115.214 kg (254 lb) 36.98 kg/m2 91%    Smoking Status                   Former Smoker           Basic Information     Date Of Birth Sex Race Ethnicity Preferred Language    1945 Male White Non- English      Your appointments     Aug 10, 2017  9:00 AM   Established Patient with Sandy Sahni M.D.   [facility] (--)    [address]  [address]  [facility] [address] [phone]           You will be receiving a confirmation call a few days before your appointment from our automated call confirmation system.            Aug 28, 2017 11:20 AM   Established Patient Pul with MARIOLA Galindo   [facility] (--)    [address]  [address]  [place] [address] [phone]              Problem List              ICD-10-CM Priority Class Noted - Resolved    Dyslipidemia (Chronic) E78.5 Medium  Unknown - Present    Gout M10.9 Low  Unknown - Present    Atrial fibrillation (CMS-HCC) (Chronic) I48.91 Medium  Unknown - Present    S/P " aortic valve replacement (Chronic) Z95.2 Medium  Unknown - Present    GERD (gastroesophageal reflux disease) (Chronic) K21.9 Low  9/11/2014 - Present    FREDDY on CPAP (Chronic) G47.33, Z99.89 Medium  Unknown - Present    Chronic anticoagulation (Chronic) Z79.01 Low  1/13/2015 - Present    Chronic maxillary sinusitis J32.0 Low  4/7/2015 - Present    CAD (coronary artery disease) (Chronic) I25.10 Medium  4/8/2015 - Present    Hx of Hyperglycemia R73.9 Low  4/9/2015 - Present    Anemia D64.9 Low  4/9/2015 - Present    Chronic combined systolic and diastolic heart failure (CMS-HCC) (Chronic) I50.42 Medium  4/16/2015 - Present    AS (aortic stenosis) of bioprosthetic valve and needs TAVR (Chronic) I35.0 Medium  6/14/2015 - Present    Essential hypertension (Chronic) I10 Medium  6/23/2015 - Present    COPD (chronic obstructive pulmonary disease) with chronic bronchitis (CMS-HCC) (Chronic) J44.9 Low  4/15/2016 - Present    CHF (congestive heart failure) (CMS-HCC) I50.9   5/22/2017 - Present    Chronic obstructive pulmonary disease (CMS-HCC) J44.9   6/8/2017 - Present    FREDDY (obstructive sleep apnea) G47.33   6/8/2017 - Present      Health Maintenance        Date Due Completion Dates    IMM DTaP/Tdap/Td Vaccine (1 - Tdap) 9/1/1964 ---    IMM INFLUENZA (1) 9/1/2017 10/26/2016, 10/20/2015, 1/15/2015    COLONOSCOPY 1/3/2021 1/3/2011 (Done)    Override on 1/3/2011: Done            Current Immunizations     13-VALENT PCV PREVNAR 4/15/2016  8:53 AM    Influenza Vaccine Adult HD 10/26/2016    Influenza Vaccine Quad Inj (Preserved) 10/20/2015, 1/15/2015    Pneumococcal polysaccharide vaccine (PPSV-23) 4/11/2015  2:51 PM    SHINGLES VACCINE 10/3/2011      Below and/or attached are the medications your provider expects you to take. Review all of your home medications and newly ordered medications with your provider and/or pharmacist. Follow medication instructions as directed by your provider and/or pharmacist. Please keep your  medication list with you and share with your provider. Update the information when medications are discontinued, doses are changed, or new medications (including over-the-counter products) are added; and carry medication information at all times in the event of emergency situations     Allergies:  CODEINE - (reactions not documented)     DIPHENHYDRAMINE - Unspecified     FLUOXETINE - (reactions not documented)     FLUTICASONE-SALMETEROL - (reactions not documented)     OLANZAPINE - Unspecified     SHELLFISH ALLERGY - Anaphylaxis     SIMVASTATIN - (reactions not documented)     ZYPREXA - (reactions not documented)     PCN - Hives,Rash               Medications  Valid as of: August 08, 2017 - 10:33 AM    Generic Name Brand Name Tablet Size Instructions for use    Albuterol Sulfate (Aero Soln) albuterol 108 (90 BASE) MCG/ACT Inhale 2 Puffs by mouth every 6 hours as needed for Shortness of Breath.        Allopurinol (Tab) ZYLOPRIM 300 MG Take 0.5 Tabs by mouth every day.        Atorvastatin Calcium (Tab) LIPITOR 40 MG TAKE 1 TABLET BY MOUTH ONCE DAILY IN THE EVENING.        Budesonide-Formoterol Fumarate (Aerosol) SYMBICORT 80-4.5 MCG/ACT Inhale 2 Puffs by mouth 2 Times a Day. Inhale 2 puffs by mouth twice daily with spacer. Rinse mouth after each use.        Carvedilol (Tab) COREG 25 MG Take 1 Tab by mouth 2 times a day, with meals.        Dextromethorphan-Guaifenesin (Syrup) ROBITUSSIN -10 MG/5ML Take 10 mL by mouth every 6 hours as needed for Cough.        DilTIAZem HCl (Tab) CARDIZEM 90 MG TAKE ONE TABLET BY MOUTH TWICE DAILY        Dronedarone HCl (Tab) MULTAQ 400 MG Take 400 mg by mouth 2 times a day, with meals.        Furosemide (Tab) LASIX 40 MG TAKE ONE TABLET BY MOUTH ONE TIME DAILY        Ipratropium Bromide HFA (Aero Soln) ATROVENT 17 MCG/ACT Inhale 2 Puffs by mouth every 6 hours as needed (for shortness of breath and wheezing.).        Ipratropium-Albuterol (Solution) DUONEB 0.5-2.5 (3) MG/3ML 3 mL  by Nebulization route every four hours as needed for Shortness of Breath (Wheezing).        Losartan Potassium (Tab) COZAAR 100 MG TAKE ONE TABLET BY MOUTH ONE TIME DAILY        Omeprazole (CAPSULE DELAYED RELEASE) PRILOSEC 20 MG TAKE ONE CAPSULE BY MOUTH ONE TIME DAILY        Potassium Chloride Sarahy CR (Tab CR) Kdur 20 MEQ Take 1 Tab by mouth 2 times a day.        Sildenafil Citrate   Take  by mouth.        Tiotropium Bromide Monohydrate (Aero Soln) Tiotropium Bromide Monohydrate 2.5 MCG/ACT Inhale 2 Inhalation by mouth every day.        Warfarin Sodium (Tab) COUMADIN 4 MG Take 1 Tab by mouth every evening.        .                 Medicines prescribed today were sent to:     [facility] [address]  [place] [place] - [address] AT [facility]    [address] [facility] [address]    Phone: [phone] Fax: [phone]    Open 24 Hours?: No      Medication refill instructions:       If your prescription bottle indicates you have medication refills left, it is not necessary to call your provider’s office. Please contact your pharmacy and they will refill your medication.    If your prescription bottle indicates you do not have any refills left, you may request refills at any time through one of the following ways: The online [employer] system (except Urgent Care), by calling your provider’s office, or by asking your pharmacy to contact your provider’s office with a refill request. Medication refills are processed only during regular business hours and may not be available until the next business day. Your provider may request additional information or to have a follow-up visit with you prior to refilling your medication.   *Please Note: Medication refills are assigned a new Rx number when refilled electronically. Your pharmacy may indicate that no refills were authorized even though a new prescription for the same medication is available at the pharmacy. Please request the medicine by name with the  pharmacy before contacting your provider for a refill.           MyChart Access Code: Activation code not generated  Current MyChart Status: Active

## 2017-08-09 ENCOUNTER — TELEPHONE (OUTPATIENT)
Dept: MEDICAL GROUP | Facility: PHYSICIAN GROUP | Age: 72
End: 2017-08-09

## 2017-08-09 NOTE — TELEPHONE ENCOUNTER
----- Message from Sandy Sahni M.D. sent at 8/9/2017  6:49 AM PDT -----  INR in the right level at 2.72. Continue the same dose of Coumadin. Recheck INR in one month. I will discuss the rest of the lab work results on follow-up visit on 8/10/17.

## 2017-08-10 ENCOUNTER — OFFICE VISIT (OUTPATIENT)
Dept: MEDICAL GROUP | Facility: PHYSICIAN GROUP | Age: 72
End: 2017-08-10
Payer: MEDICARE

## 2017-08-10 VITALS
DIASTOLIC BLOOD PRESSURE: 68 MMHG | BODY MASS INDEX: 37.47 KG/M2 | RESPIRATION RATE: 18 BRPM | HEART RATE: 62 BPM | HEIGHT: 69 IN | WEIGHT: 253 LBS | SYSTOLIC BLOOD PRESSURE: 128 MMHG | OXYGEN SATURATION: 92 % | TEMPERATURE: 97.4 F

## 2017-08-10 DIAGNOSIS — I50.42 CHRONIC COMBINED SYSTOLIC AND DIASTOLIC HEART FAILURE (HCC): Chronic | ICD-10-CM

## 2017-08-10 DIAGNOSIS — E78.5 DYSLIPIDEMIA: ICD-10-CM

## 2017-08-10 DIAGNOSIS — R97.20 ELEVATED PSA: ICD-10-CM

## 2017-08-10 DIAGNOSIS — G47.33 OSA ON CPAP: Chronic | ICD-10-CM

## 2017-08-10 DIAGNOSIS — R73.01 IMPAIRED FASTING BLOOD SUGAR: ICD-10-CM

## 2017-08-10 DIAGNOSIS — I10 ESSENTIAL HYPERTENSION: ICD-10-CM

## 2017-08-10 DIAGNOSIS — J44.9 CHRONIC OBSTRUCTIVE PULMONARY DISEASE, UNSPECIFIED COPD TYPE (HCC): ICD-10-CM

## 2017-08-10 DIAGNOSIS — I48.0 PAF (PAROXYSMAL ATRIAL FIBRILLATION) (HCC): ICD-10-CM

## 2017-08-10 PROCEDURE — 99214 OFFICE O/P EST MOD 30 MIN: CPT | Performed by: FAMILY MEDICINE

## 2017-08-10 ASSESSMENT — PAIN SCALES - GENERAL: PAINLEVEL: NO PAIN

## 2017-08-10 NOTE — MR AVS SNAPSHOT
"        Hal Nava   8/10/2017 9:00 AM   Office Visit   MRN: [id number]    Department:  [facility]   Dept Phone:  [phone]    Description:  Male : 1945   Provider:  Sandy Sahni M.D.           Reason for Visit     Results Labs      Allergies as of 8/10/2017     Allergen Noted Reactions    Codeine 2014       Constipation    Diphenhydramine 2017   Unspecified    PER NEUROLOGIST    Fluoxetine 2017       Excessive sedation    Fluticasone-Salmeterol 2017       Headache    Olanzapine 2017   Unspecified    SEDATION    Shellfish Allergy 2015   Anaphylaxis    Simvastatin 10/19/2016       myalgia-per pt.    Zyprexa 2014       Excessive sedation    Pcn [Penicillins] 2014   HivSamson nichols    2003;rash  Per patient  Tolerates rocephin      Vital Signs     Blood Pressure Pulse Temperature Respirations Height Weight    128/68 mmHg 62 36.3 °C (97.4 °F) 18 1.753 m (5' 9\") 114.76 kg (253 lb)    Body Mass Index Oxygen Saturation Smoking Status             37.34 kg/m2 92% Former Smoker         Basic Information     Date Of Birth Sex Race Ethnicity Preferred Language    1945 Male White Non- English      Your appointments     Aug 28, 2017 11:20 AM   Established Patient Pul with MARIOLA Galindo   [facility] (--)    [address]  [address]  [facility] [id number] [phone]              Problem List              ICD-10-CM Priority Class Noted - Resolved    Dyslipidemia (Chronic) E78.5 Medium  Unknown - Present    Gout M10.9 Low  Unknown - Present    Atrial fibrillation (CMS-HCC) (Chronic) I48.91 Medium  Unknown - Present    S/P aortic valve replacement (Chronic) Z95.2 Medium  Unknown - Present    GERD (gastroesophageal reflux disease) (Chronic) K21.9 Low  2014 - Present    FREDDY on CPAP (Chronic) G47.33, Z99.89 Medium  Unknown - Present    Chronic anticoagulation (Chronic) Z79.01 Low  2015 - Present   " Chronic maxillary sinusitis J32.0 Low  4/7/2015 - Present    CAD (coronary artery disease) (Chronic) I25.10 Medium  4/8/2015 - Present    Hx of Hyperglycemia R73.9 Low  4/9/2015 - Present    Anemia D64.9 Low  4/9/2015 - Present    Chronic combined systolic and diastolic heart failure (CMS-HCC) (Chronic) I50.42 Medium  4/16/2015 - Present    AS (aortic stenosis) of bioprosthetic valve and needs TAVR (Chronic) I35.0 Medium  6/14/2015 - Present    Essential hypertension (Chronic) I10 Medium  6/23/2015 - Present    COPD (chronic obstructive pulmonary disease) with chronic bronchitis (CMS-HCC) (Chronic) J44.9 Low  4/15/2016 - Present      Health Maintenance        Date Due Completion Dates    IMM DTaP/Tdap/Td Vaccine (1 - Tdap) 9/1/1964 ---    IMM INFLUENZA (1) 9/1/2017 10/26/2016, 10/20/2015, 1/15/2015    COLONOSCOPY 1/3/2021 1/3/2011 (Done)    Override on 1/3/2011: Done            Current Immunizations     13-VALENT PCV PREVNAR 4/15/2016  8:53 AM    Influenza Vaccine Adult HD 10/26/2016    Influenza Vaccine Quad Inj (Preserved) 10/20/2015, 1/15/2015    Pneumococcal polysaccharide vaccine (PPSV-23) 4/11/2015  2:51 PM    SHINGLES VACCINE 10/3/2011      Below and/or attached are the medications your provider expects you to take. Review all of your home medications and newly ordered medications with your provider and/or pharmacist. Follow medication instructions as directed by your provider and/or pharmacist. Please keep your medication list with you and share with your provider. Update the information when medications are discontinued, doses are changed, or new medications (including over-the-counter products) are added; and carry medication information at all times in the event of emergency situations     Allergies:  CODEINE - (reactions not documented)     DIPHENHYDRAMINE - Unspecified     FLUOXETINE - (reactions not documented)     FLUTICASONE-SALMETEROL - (reactions not documented)     OLANZAPINE - Unspecified      SHELLFISH ALLERGY - Anaphylaxis     SIMVASTATIN - (reactions not documented)     ZYPREXA - (reactions not documented)     PCN - Hives,Rash               Medications  Valid as of: August 10, 2017 - 10:07 AM    Generic Name Brand Name Tablet Size Instructions for use    Albuterol Sulfate (Aero Soln) albuterol 108 (90 BASE) MCG/ACT Inhale 2 Puffs by mouth every 6 hours as needed for Shortness of Breath.        Allopurinol (Tab) ZYLOPRIM 300 MG Take 0.5 Tabs by mouth every day.        Atorvastatin Calcium (Tab) LIPITOR 40 MG TAKE 1 TABLET BY MOUTH ONCE DAILY IN THE EVENING.        Budesonide-Formoterol Fumarate (Aerosol) SYMBICORT 80-4.5 MCG/ACT Inhale 2 Puffs by mouth 2 Times a Day. Inhale 2 puffs by mouth twice daily with spacer. Rinse mouth after each use.        Carvedilol (Tab) COREG 25 MG Take 1 Tab by mouth 2 times a day, with meals.        Dextromethorphan-Guaifenesin (Syrup) ROBITUSSIN -10 MG/5ML Take 10 mL by mouth every 6 hours as needed for Cough.        DilTIAZem HCl (Tab) CARDIZEM 90 MG TAKE ONE TABLET BY MOUTH TWICE DAILY        Dronedarone HCl (Tab) MULTAQ 400 MG Take 400 mg by mouth 2 times a day, with meals.        Furosemide (Tab) LASIX 40 MG TAKE ONE TABLET BY MOUTH ONE TIME DAILY        Ipratropium Bromide HFA (Aero Soln) ATROVENT 17 MCG/ACT Inhale 2 Puffs by mouth every 6 hours as needed (for shortness of breath and wheezing.).        Ipratropium-Albuterol (Solution) DUONEB 0.5-2.5 (3) MG/3ML 3 mL by Nebulization route every four hours as needed for Shortness of Breath (Wheezing).        Losartan Potassium (Tab) COZAAR 100 MG TAKE ONE TABLET BY MOUTH ONE TIME DAILY        Omeprazole (CAPSULE DELAYED RELEASE) PRILOSEC 20 MG TAKE ONE CAPSULE BY MOUTH ONE TIME DAILY        Potassium Chloride Sarahy CR (Tab CR) Kdur 20 MEQ Take 1 Tab by mouth 2 times a day.        Sildenafil Citrate   Take  by mouth.        Tiotropium Bromide Monohydrate (Aero Soln) Tiotropium Bromide Monohydrate 2.5 MCG/ACT Inhale  2 Inhalation by mouth every day.        Warfarin Sodium (Tab) COUMADIN 4 MG Take 1 Tab by mouth every evening.        .                 Medicines prescribed today were sent to:     [id number] - [address] AT [facility] [address]    [address] [address]    Phone: [phone] Fax: [phone]    Open 24 Hours?: No      Medication refill instructions:       If your prescription bottle indicates you have medication refills left, it is not necessary to call your provider’s office. Please contact your pharmacy and they will refill your medication.    If your prescription bottle indicates you do not have any refills left, you may request refills at any time through one of the following ways: The online [employer] system (except Urgent Care), by calling your provider’s office, or by asking your pharmacy to contact your provider’s office with a refill request. Medication refills are processed only during regular business hours and may not be available until the next business day. Your provider may request additional information or to have a follow-up visit with you prior to refilling your medication.   *Please Note: Medication refills are assigned a new Rx number when refilled electronically. Your pharmacy may indicate that no refills were authorized even though a new prescription for the same medication is available at the pharmacy. Please request the medicine by name with the pharmacy before contacting your provider for a refill.           [employer] Access Code: Activation code not generated  Current [employer] Status: Active

## 2017-08-10 NOTE — PROGRESS NOTES
Subjective:      Hal Nava is a 71 y.o. male who presents with Results            HPI     Patient returns for follow-up of his medical problems. He is accompanied by his wife.    He had his wife are moving to [place]. He prefers to be in place with lower elevation which works better for his heart disease and COPD. They have sold the house in 3 days and they are moving to [place] next week on 8/14/17. This is his last visit.    In terms of his COPD, he continues to use Spiriva and Atrovent medialization. So far these have worked well for him without any problems. He has used other inhalers in the past which caused side effects. He uses oxygen at night with his CPAP machine for his sleep apnea 2 L/m.    For his combined systolic and diastolic heart failure, he continues to take carvedilol, Cardizem and furosemide. He denies any lower extremity edema. He denies chest pain, palpitations, shortness of breath.    For his paroxysmal atrial fibrillation, he continues to take Cardizem and long-term anticoagulation with Coumadin. He denies any chest pain, palpitations.    Blood pressure is under control on Cardizem, carvedilol and furosemide.     He continues to take atorvastatin for dyslipidemia.    He tries to manage his blood sugar by watching his diet. He is not very good in watching the carbohydrates.    He has elevated PSA. He had prostate biopsy ×2 at [facility] when he was still living in [place] which came back benign. He has been seen and evaluated by the urologist here in the area who recommended observation. He had another PSA done prior to this visit. He is not on any medication for his prostate. He denies any problems with urination.    Past medical history, past surgical history, family history reviewed-no changes    Social history reviewed-no changes    Allergies reviewed-no changes    Medications reviewed-no changes        ROS     Review of systems as per history of present illness, the rest  "negative.       Objective:     /68 mmHg  Pulse 62  Temp(Src) 36.3 °C (97.4 °F)  Resp 18  Ht 1.753 m (5' 9\")  Wt 114.76 kg (253 lb)  BMI 37.34 kg/m2  SpO2 92%     Physical Exam    Examined alert, awake, oriented, not in distress    Neck-supple, no lymphadenopathy, no thyromegaly  Lungs-clear to auscultation, no rales, no wheezes  Heart-regular rate and rhythm, no A. fib appreciated, very distant heart sounds  Extremities-no edema, clubbing, cyanosis     Hospital Outpatient Visit on 08/08/2017   Component Date Value   • WBC 08/08/2017 9.1    • RBC 08/08/2017 4.87    • Hemoglobin 08/08/2017 13.9*   • Hematocrit 08/08/2017 43.3    • MCV 08/08/2017 88.9    • MCH 08/08/2017 28.5    • MCHC 08/08/2017 32.1*   • RDW 08/08/2017 47.6    • Platelet Count 08/08/2017 183    • MPV 08/08/2017 9.9    • Neutrophils-Polys 08/08/2017 69.10    • Lymphocytes 08/08/2017 18.70*   • Monocytes 08/08/2017 7.70    • Eosinophils 08/08/2017 4.10    • Basophils 08/08/2017 0.10    • Immature Granulocytes 08/08/2017 0.30    • Nucleated RBC 08/08/2017 0.00    • Neutrophils (Absolute) 08/08/2017 6.27    • Lymphs (Absolute) 08/08/2017 1.70    • Monos (Absolute) 08/08/2017 0.70    • Eos (Absolute) 08/08/2017 0.37    • Baso (Absolute) 08/08/2017 0.01    • Immature Granulocytes (a* 08/08/2017 0.03    • NRBC (Absolute) 08/08/2017 0.00    • Sodium 08/08/2017 139    • Potassium 08/08/2017 3.8    • Chloride 08/08/2017 106    • Co2 08/08/2017 27    • Anion Gap 08/08/2017 6.0    • Glucose 08/08/2017 105*   • Bun 08/08/2017 17    • Creatinine 08/08/2017 0.89    • Calcium 08/08/2017 9.3    • AST(SGOT) 08/08/2017 16    • ALT(SGPT) 08/08/2017 13    • Alkaline Phosphatase 08/08/2017 82    • Total Bilirubin 08/08/2017 0.7    • Albumin 08/08/2017 3.5    • Total Protein 08/08/2017 6.4    • Globulin 08/08/2017 2.9    • A-G Ratio 08/08/2017 1.2    • Cholesterol,Tot 08/08/2017 147    • Triglycerides 08/08/2017 139 previously 60    • HDL 08/08/2017 " 32*previously 37    • LDL 08/08/2017 87 previously 85    • Glycohemoglobin 08/08/2017 6.5*previously 6.0    • Est Avg Glucose 08/08/2017 140    • Prostatic Specific Antig* 08/08/2017 4.50*previously 5.2    • PT 08/08/2017 29.7*   • INR 08/08/2017 2.72*   • GFR If  08/08/2017 >60    • GFR If Non  Ameri* 08/08/2017 >60       Assessment/Plan:     There are no diagnoses linked to this encounter.    1. Chronic obstructive pulmonary disease, unspecified COPD type (CMS-HCC)     2. FREDDY on CPAP     3. Chronic combined systolic and diastolic heart failure (CMS-HCC)     4. PAF (paroxysmal atrial fibrillation) (CMS-HCC)     5. Essential hypertension     6. Dyslipidemia     7. Impaired fasting blood sugar     8. Elevated PSA       1. Currently compensated. Continue Spiriva and Atrovent utilization treatment. He will establish with a pulmonologist in [place].  2. Continue CPAP machine. He will establish a pulmonologist in [place].  3. Currently compensated without fluid overload. Continue medications.  4. Currently in sinus rhythm. Rate is controlled. He is on Coumadin and Cardizem. He needs to establish with a cardiologist in [place].  5. Controlled on his medications.  6. All at target except HDL stays low. Advised increase activity with regular exercises. Continue atorvastatin.  7. His fasting blood sugar is borderline elevated and nondiabetic level but his hemoglobin A1c is 6.5 which is already in the diabetic level. Advised work harder with avoidance of sweets and decreasing the carbohydrates. Discussed smaller portions of food. Avoid sugary drinks including soda and alcohol. Discussed weight loss and regular exercises.  8. PSA improved and this is lower than before. He prostate biopsy ×2 in the past which came back benign. Currently without any urinary symptoms.     This is his last visit because he is moving to [place] with his wife. I wish him well.    Please note that this dictation was created using voice  recognition software. I have worked with consultants from the vendor as well as technical experts from [facility] to optimize the interface. I have made every reasonable attempt to correct obvious errors, but I expect that there are errors of grammar and possibly content I did not discover before finalizing the note.

## 2017-08-28 ENCOUNTER — APPOINTMENT (OUTPATIENT)
Dept: PULMONOLOGY | Facility: HOSPICE | Age: 72
End: 2017-08-28
Payer: MEDICARE

## 2017-09-11 ENCOUNTER — PATIENT OUTREACH (OUTPATIENT)
Dept: HEALTH INFORMATION MANAGEMENT | Facility: OTHER | Age: 72
End: 2017-09-11

## 2017-09-11 NOTE — PROGRESS NOTES
Attempt #:1    WebIZ Checked & Epic Updated: yes  HealthConnect Verified: no  Verify PCP: yes    Communication Preference Obtained: yes     Review Care Team: no    Annual Wellness Visit Scheduling  1. Scheduling Status:Not Scheduled. Patient states they moved out of the area and Patient states they are no longer with PCP    Care Gap Scheduling (Attempt to Schedule EACH Overdue Care Gap!)     Health Maintenance Due   Topic Date Due   • IMM DTaP/Tdap/Td Vaccine (1 - Tdap) Pt is aware    • PFT SCREENING-FEV1 AND FEV/FVC RATIO / SPIROMETRY SHOULD BE PERFORMED ANNUALLY  04/08/2017   • IMM INFLUENZA (1) Pt is aware          [employer] Activation: already active  [employer] Shola: no  Virtual Visits: no  Opt In to Text Messages: no

## 2017-12-13 DIAGNOSIS — M10.00 ACUTE IDIOPATHIC GOUT, UNSPECIFIED SITE: ICD-10-CM

## 2017-12-13 RX ORDER — ALLOPURINOL 300 MG/1
TABLET ORAL
Qty: 45 TAB | Refills: 0 | OUTPATIENT
Start: 2017-12-13

## 2017-12-13 NOTE — TELEPHONE ENCOUNTER
Was the patient seen in the last year in this department?  Yes     Does patient have an active prescription for medications requested? No     Received Request Via: Pharmacy

## 2018-01-13 DIAGNOSIS — M10.00 ACUTE IDIOPATHIC GOUT, UNSPECIFIED SITE: ICD-10-CM

## 2018-01-15 RX ORDER — ALLOPURINOL 300 MG/1
TABLET ORAL
Qty: 45 TAB | Refills: 0 | Status: SHIPPED | OUTPATIENT
Start: 2018-01-15

## 2018-02-15 ENCOUNTER — PATIENT OUTREACH (OUTPATIENT)
Dept: HEALTH INFORMATION MANAGEMENT | Facility: OTHER | Age: 73
End: 2018-02-15

## 2018-08-15 ENCOUNTER — PATIENT OUTREACH (OUTPATIENT)
Dept: HEALTH INFORMATION MANAGEMENT | Facility: OTHER | Age: 73
End: 2018-08-15

## 2019-04-17 NOTE — PROGRESS NOTES
April 17, 2019      Aram Roa  [address] [address]      Dear Aram,    Our records show that you may be due to be screened for colorectal cancer. Colorectal cancer is a leading cause of cancer death in this country. Colorectal cancer can be stopped in its tracks or even prevented with simple screening tests. The tests most commonly recommended for normal risk men and women age 50 and older are:    · Screening Colonoscopy every 10 years.  In this procedure, a flexible tube is used to find cancer cells or polyps inside the rectum and entire colon.  This is the preferred screening method in [place].  If pre-cancerous polyps are found and removed at the time of the examination, a cancer can be prevented.    · Fecal Occult Blood Test every year.  We offer a one-sample Fecal Occult Blood Test. This simple test checks a stool sample for hidden blood, which can be a sign of cancer.  If hidden blood is detected at the time of the examination, a follow-up colonoscopy is recommended.    · Cologuard stool test.     This stool test is done every 3 years and looks for hidden blood as well as abnormal DNA shed from the bowel which improves the accuracy of the test.  Most insurance and Medicare are covering this, but you should check with your insurance to be certain.    If you have had one of these tests done elsewhere, please call us with the information so we can update your records.    Your good health is important to us - Colorectal cancer screening is important for you.  If you have any questions or concerns, please contact our office at [phone].     Sincerely,    KENDALL Edouard  [facility]  [address] [place] [address]  Ph. [phone]      Renetta pt's wife stated that now their living in [place].

## 2019-08-20 RX ORDER — POTASSIUM CHLORIDE 20 MEQ/1
20 TABLET, EXTENDED RELEASE ORAL 2 TIMES DAILY
Qty: 60 TAB | Refills: 10 | OUTPATIENT
Start: 2019-08-20

## 2022-09-29 NOTE — PROGRESS NOTES
Subjective:   Hal Nava is a 71 y.o. male who presents today for follow up before moving to [place].    He is a patient of Dr. Fofana in our office. Hx of HLD, paroxysmal afib, COPD, FREDDY (CPAP and O2), CAD (50% stenosis in OM), previous AVR and TAVR in '15, HTN, and obesity.    He is feeling great. He has no issues and is excited to be moving to [place] soon. He already has his doctor apt's lined up for there as well.    He has had no episodes of chest pain, palpitations, dizziness/lightheadedness, orthopnea, or peripheral edema.    Past Medical History   Diagnosis Date   • Dyslipidemia    • Gout    • Atrial fibrillation (CMS-HCC)    • Indigestion    • Breath shortness    • Arthritis      gout, osteo hands, feet knees   • Bronchitis 12/2014   • Asthma      uses inhalers   • Heart murmur    • Hypertension    • GERD (gastroesophageal reflux disease)    • CHF (congestive heart failure) (CMS-HCC)    • Allergy    • Sleep apnea    • Pneumonia 2/2015   • COPD (chronic obstructive pulmonary disease) (CMS-HCC)      Past Surgical History   Procedure Laterality Date   • Aortic valve replacement  2004, 2015     bovine valve   • Septoplasty     • Colonoscopy  1/3/11     colonic ileo cecal valve prominent fold-prominent lymphoid tissue   • Ethmoidectomy  4/7/2015     Performed by Gopal Guzmán M.D. at SURGERY SAME DAY [facility] ORS   • Antrostomy  4/7/2015     Performed by Gopal Guzmán M.D. at SURGERY SAME DAY [facility] ORS   • Kyphoplasty  6/15   • Aortic valve replacement       TAVR     Family History   Problem Relation Age of Onset   • Heart Disease Mother      CAD s/p CABG   • Diabetes Mother    • Cancer Mother      breast   • Heart Attack Father 1943   • Stroke Father    • Diabetes Father      type 2   • Hypertension Sister    • Diabetes Sister      prediabetes   • Hyperlipidemia Sister      History   Smoking status   • Former Smoker -- 1.00 packs/day for 15 years   • Types: Cigarettes   • Quit date: 01/01/1984   Smokeless  The left coronary artery was selectively engaged and injected. Multiple views of the injected vessel were taken.  tobacco   • Never Used     Comment: quit in 1984     Allergies   Allergen Reactions   • Codeine      Constipation   • Diphenhydramine Unspecified     PER NEUROLOGIST   • Fluoxetine      Excessive sedation   • Fluticasone-Salmeterol      Headache   • Olanzapine Unspecified     SEDATION   • Shellfish Allergy Anaphylaxis   • Simvastatin      myalgia-per pt.   • Zyprexa      Excessive sedation   • Pcn [Penicillins] Hives and Rash     2003-12-04;rash  Per patient  Tolerates rocephin     Outpatient Encounter Prescriptions as of 8/8/2017   Medication Sig Dispense Refill   • Sildenafil Citrate (VIAGRA PO) Take  by mouth.     • warfarin (COUMADIN) 4 MG Tab Take 1 Tab by mouth every evening. 90 Tab 1   • losartan (COZAAR) 100 MG Tab TAKE ONE TABLET BY MOUTH ONE TIME DAILY 90 Tab 1   • atorvastatin (LIPITOR) 40 MG Tab TAKE 1 TABLET BY MOUTH ONCE DAILY IN THE EVENING. 90 Tab 1   • albuterol (PROAIR HFA) 108 (90 BASE) MCG/ACT Aero Soln inhalation aerosol Inhale 2 Puffs by mouth every 6 hours as needed for Shortness of Breath. 8.5 g 2   • ipratropium-albuterol (DUONEB) 0.5-2.5 (3) MG/3ML nebulizer solution 3 mL by Nebulization route every four hours as needed for Shortness of Breath (Wheezing). 120 Vial 0   • potassium chloride SA (KDUR) 20 MEQ Tab CR Take 1 Tab by mouth 2 times a day. 60 Tab 10   • guaifenesin DM (ROBITUSSIN DM) 100-10 MG/5ML Syrup syrup Take 10 mL by mouth every 6 hours as needed for Cough. 240 mL 0   • omeprazole (PRILOSEC) 20 MG delayed-release capsule TAKE ONE CAPSULE BY MOUTH ONE TIME DAILY 90 Cap 1   • diltiazem (CARDIZEM) 90 MG Tab TAKE ONE TABLET BY MOUTH TWICE DAILY 180 Tab 1   • Tiotropium Bromide Monohydrate (SPIRIVA RESPIMAT) 2.5 MCG/ACT Aero Soln Inhale 2 Inhalation by mouth every day. 1 Inhaler 5   • furosemide (LASIX) 40 MG Tab TAKE ONE TABLET BY MOUTH ONE TIME DAILY 30 Tab 3   • ipratropium (ATROVENT HFA) 17 MCG/ACT Aero Soln Inhale 2 Puffs by mouth every 6 hours as needed (for shortness of  "breath and wheezing.). 1 Inhaler 5   • allopurinol (ZYLOPRIM) 300 MG Tab Take 0.5 Tabs by mouth every day. 45 Tab 1   • carvedilol (COREG) 25 MG Tab Take 1 Tab by mouth 2 times a day, with meals. 180 Tab 3   • dronedarone (MULTAQ) 400 MG Tab Take 400 mg by mouth 2 times a day, with meals.     • budesonide-formoterol (SYMBICORT) 80-4.5 MCG/ACT Aerosol Inhale 2 Puffs by mouth 2 Times a Day. Inhale 2 puffs by mouth twice daily with spacer. Rinse mouth after each use. (Patient not taking: Reported on 8/8/2017) 1 Inhaler 5     No facility-administered encounter medications on file as of 8/8/2017.     Review of Systems   Constitutional: Negative for fever and malaise/fatigue.   Respiratory: Positive for shortness of breath and wheezing. Negative for cough and sputum production.    Cardiovascular: Negative for chest pain, palpitations, orthopnea, claudication, leg swelling and PND.   Gastrointestinal: Negative for abdominal pain.   Musculoskeletal: Negative for myalgias.   Neurological: Negative for dizziness and weakness.   All other systems reviewed and are negative.       Objective:   /60 mmHg  Pulse 64  Ht 1.765 m (5' 9.49\")  Wt 115.214 kg (254 lb)  BMI 36.98 kg/m2  SpO2 91%    Physical Exam   Constitutional: He is oriented to person, place, and time. He appears well-developed. No distress.   obese   HENT:   Head: Normocephalic and atraumatic.   Eyes: EOM are normal.   Neck: Normal range of motion. No JVD present.   Cardiovascular: Normal rate, normal heart sounds, intact distal pulses and normal pulses.  An irregularly irregular rhythm present.   No murmur heard.  Pulmonary/Chest: Accessory muscle usage present. No respiratory distress. He has decreased breath sounds in the right lower field and the left lower field. He has wheezes in the right upper field and the left upper field. He has rales in the right upper field and the left upper field.   Abdominal: Soft. Bowel sounds are normal.   Musculoskeletal: " Normal range of motion. He exhibits no edema.   Neurological: He is alert and oriented to person, place, and time.   Skin: Skin is warm and dry.   Psychiatric: He has a normal mood and affect.   Nursing note and vitals reviewed.    Lab Results   Component Value Date/Time    CHOLESTEROL, 05/23/2017 03:47 AM    LDL 85 05/23/2017 03:47 AM    HDL 37* 05/23/2017 03:47 AM    TRIGLYCERIDES 60 05/23/2017 03:47 AM       Lab Results   Component Value Date/Time    SODIUM 140 05/23/2017 03:47 AM    POTASSIUM 4.0 05/23/2017 03:47 AM    CHLORIDE 104 05/23/2017 03:47 AM    CO2 26 05/23/2017 03:47 AM    GLUCOSE 196* 05/23/2017 03:47 AM    BUN 18 05/23/2017 03:47 AM    CREATININE 0.98 05/23/2017 03:47 AM     Lab Results   Component Value Date/Time    ALKALINE PHOSPHATASE 98 05/23/2017 03:47 AM    AST(SGOT) 18 05/23/2017 03:47 AM    ALT(SGPT) 11 05/23/2017 03:47 AM    TOTAL BILIRUBIN 0.4 05/23/2017 03:47 AM      2017 HOLTER MONITOR RESULTS  OCCASIONAL PREMATURE VENTRICULAR COMPLEX   NO SIGNIFICANT AV BLOCK   NO SINUS PAUSE     2017 ECHO CONCLUSIONS  Prior echocardiogram 7/24/2015.  Known bioprosthetic aortic valve that is functioning normally with   normal transvalvular gradients.Transvalvular gradients are - Peak: 34   mmHg, Mean: 17 mmHg. Dimensionless index is (0.42). No aortic   insufficiency.  Normal left ventricular systolic function.     Assessment:     1. Dyslipidemia     2. Chronic atrial fibrillation (CMS-HCC)     3. S/P aortic valve replacement     4. FREDDY on CPAP     5. Coronary artery disease involving native heart without angina pectoris, unspecified vessel or lesion type     6. Chronic combined systolic and diastolic heart failure (CMS-[facility])     7. Aortic valve stenosis, unspecified etiology     8. Essential hypertension       Medical Decision Making:  Today's Assessment / Status / Plan:     1. CAD (50% OM residual), no angina, GALINDO with COPD. Continue statin, coreg. No ASA with coumadin.    2. Previous AVR and  more recently re-do with TAVR in '15, echo shows normal gradients. Normal EF. Follow with bi-yearly echo or unless warranted.    3. Chronic afib, rate controlled. Asymptomatic. Coreg, diltiazem, and coumadin. Coumadin per [facility]-therapeutic.     4. HLD, statin. LDL goal <70 with CAD. Good labs this year.    5. HTN, great control with losartan, coreg, diltiazem, and lasix.     6. FREDDY with CPAP and O2, managed by pulmonary.    7. HF, no signs of active HF. Follow clinically.    8. COPD, managed by pulmonary.     FU in [place] with new cardiologist; echo as warranted.    Patient verbalizes understanding and agrees with the plan of care.     Collaborating MD: Shanna RASHEED

## 2023-02-24 NOTE — DISCHARGE PLANNING
Patient on service with [facility] sent orders to [facility] to add day time O2 with [facility].   DISCHARGE